# Patient Record
Sex: FEMALE | Race: WHITE | HISPANIC OR LATINO
[De-identification: names, ages, dates, MRNs, and addresses within clinical notes are randomized per-mention and may not be internally consistent; named-entity substitution may affect disease eponyms.]

---

## 2018-05-15 ENCOUNTER — APPOINTMENT (OUTPATIENT)
Dept: HEPATOLOGY | Facility: CLINIC | Age: 33
End: 2018-05-15
Payer: COMMERCIAL

## 2018-05-15 VITALS
DIASTOLIC BLOOD PRESSURE: 81 MMHG | BODY MASS INDEX: 25.4 KG/M2 | SYSTOLIC BLOOD PRESSURE: 119 MMHG | TEMPERATURE: 98.6 F | HEIGHT: 59 IN | HEART RATE: 96 BPM | RESPIRATION RATE: 14 BRPM | WEIGHT: 126 LBS

## 2018-05-15 DIAGNOSIS — Z78.9 OTHER SPECIFIED HEALTH STATUS: ICD-10-CM

## 2018-05-15 PROCEDURE — 99204 OFFICE O/P NEW MOD 45 MIN: CPT

## 2018-05-16 LAB
AFP-TM SERPL-MCNC: 1.6 NG/ML
ALBUMIN SERPL ELPH-MCNC: 4.5 G/DL
ALP BLD-CCNC: 51 U/L
ALT SERPL-CCNC: 12 U/L
ANION GAP SERPL CALC-SCNC: 15 MMOL/L
AST SERPL-CCNC: 18 U/L
BASOPHILS # BLD AUTO: 0.01 K/UL
BASOPHILS NFR BLD AUTO: 0.2 %
BILIRUB SERPL-MCNC: <0.2 MG/DL
BUN SERPL-MCNC: 8 MG/DL
CALCIUM SERPL-MCNC: 9.8 MG/DL
CHLORIDE SERPL-SCNC: 105 MMOL/L
CO2 SERPL-SCNC: 22 MMOL/L
CREAT SERPL-MCNC: 0.56 MG/DL
EOSINOPHIL # BLD AUTO: 0.13 K/UL
EOSINOPHIL NFR BLD AUTO: 2.1 %
GLUCOSE SERPL-MCNC: 86 MG/DL
HBV CORE IGG+IGM SER QL: REACTIVE
HCT VFR BLD CALC: 36.2 %
HGB BLD-MCNC: 11.6 G/DL
IMM GRANULOCYTES NFR BLD AUTO: 0.3 %
INR PPP: 0.93 RATIO
LYMPHOCYTES # BLD AUTO: 3.02 K/UL
LYMPHOCYTES NFR BLD AUTO: 47.9 %
MAN DIFF?: NORMAL
MCHC RBC-ENTMCNC: 28.4 PG
MCHC RBC-ENTMCNC: 32 GM/DL
MCV RBC AUTO: 88.5 FL
MONOCYTES # BLD AUTO: 0.6 K/UL
MONOCYTES NFR BLD AUTO: 9.5 %
NEUTROPHILS # BLD AUTO: 2.53 K/UL
NEUTROPHILS NFR BLD AUTO: 40 %
PLATELET # BLD AUTO: 460 K/UL
POTASSIUM SERPL-SCNC: 4 MMOL/L
PROT SERPL-MCNC: 8 G/DL
PT BLD: 10.5 SEC
RBC # BLD: 4.09 M/UL
RBC # FLD: 13.4 %
SODIUM SERPL-SCNC: 142 MMOL/L
WBC # FLD AUTO: 6.31 K/UL

## 2018-05-17 LAB
HBV DNA # SERPL NAA+PROBE: 2214 IU/ML
HBV E AB SER QL: POSITIVE
HBV E AG SER QL: NEGATIVE
HEPB DNA PCR LOG: 3.35 LOGIU/ML

## 2018-05-31 ENCOUNTER — APPOINTMENT (OUTPATIENT)
Dept: HEPATOLOGY | Facility: CLINIC | Age: 33
End: 2018-05-31

## 2018-06-04 ENCOUNTER — APPOINTMENT (OUTPATIENT)
Dept: HEPATOLOGY | Facility: CLINIC | Age: 33
End: 2018-06-04
Payer: COMMERCIAL

## 2018-06-04 VITALS
BODY MASS INDEX: 25.4 KG/M2 | TEMPERATURE: 98.2 F | WEIGHT: 126 LBS | RESPIRATION RATE: 16 BRPM | DIASTOLIC BLOOD PRESSURE: 67 MMHG | HEIGHT: 59 IN | SYSTOLIC BLOOD PRESSURE: 111 MMHG | HEART RATE: 72 BPM

## 2018-06-04 DIAGNOSIS — B18.1 CHRONIC VIRAL HEPATITIS B W/OUT DELTA-AGENT: ICD-10-CM

## 2018-06-04 PROCEDURE — 91200 LIVER ELASTOGRAPHY: CPT

## 2018-06-04 PROCEDURE — ZZZZZ: CPT

## 2018-06-04 PROCEDURE — 99214 OFFICE O/P EST MOD 30 MIN: CPT | Mod: 25

## 2018-06-07 ENCOUNTER — FORM ENCOUNTER (OUTPATIENT)
Age: 33
End: 2018-06-07

## 2018-06-08 ENCOUNTER — OUTPATIENT (OUTPATIENT)
Dept: OUTPATIENT SERVICES | Facility: HOSPITAL | Age: 33
LOS: 1 days | End: 2018-06-08
Payer: COMMERCIAL

## 2018-06-08 ENCOUNTER — APPOINTMENT (OUTPATIENT)
Dept: ULTRASOUND IMAGING | Facility: IMAGING CENTER | Age: 33
End: 2018-06-08
Payer: COMMERCIAL

## 2018-06-08 DIAGNOSIS — B18.1 CHRONIC VIRAL HEPATITIS B WITHOUT DELTA-AGENT: ICD-10-CM

## 2018-06-08 PROCEDURE — 76700 US EXAM ABDOM COMPLETE: CPT

## 2018-06-08 PROCEDURE — 76700 US EXAM ABDOM COMPLETE: CPT | Mod: 26

## 2018-09-04 ENCOUNTER — APPOINTMENT (OUTPATIENT)
Dept: HEPATOLOGY | Facility: CLINIC | Age: 33
End: 2018-09-04

## 2019-08-19 ENCOUNTER — OUTPATIENT (OUTPATIENT)
Dept: OUTPATIENT SERVICES | Facility: HOSPITAL | Age: 34
LOS: 1 days | End: 2019-08-19

## 2019-08-19 ENCOUNTER — INPATIENT (INPATIENT)
Facility: HOSPITAL | Age: 34
LOS: 2 days | Discharge: HOME | End: 2019-08-22
Attending: OBSTETRICS & GYNECOLOGY | Admitting: OBSTETRICS & GYNECOLOGY

## 2019-08-19 VITALS
DIASTOLIC BLOOD PRESSURE: 74 MMHG | TEMPERATURE: 99 F | OXYGEN SATURATION: 98 % | HEIGHT: 59 IN | WEIGHT: 147.05 LBS | SYSTOLIC BLOOD PRESSURE: 114 MMHG | RESPIRATION RATE: 16 BRPM | HEART RATE: 77 BPM

## 2019-08-19 VITALS
TEMPERATURE: 98 F | HEART RATE: 75 BPM | SYSTOLIC BLOOD PRESSURE: 112 MMHG | RESPIRATION RATE: 18 BRPM | DIASTOLIC BLOOD PRESSURE: 63 MMHG

## 2019-08-19 DIAGNOSIS — O24.414 GESTATIONAL DIABETES MELLITUS IN PREGNANCY, INSULIN CONTROLLED: ICD-10-CM

## 2019-08-19 DIAGNOSIS — N97.9 FEMALE INFERTILITY, UNSPECIFIED: Chronic | ICD-10-CM

## 2019-08-19 DIAGNOSIS — O24.410 GESTATIONAL DIABETES MELLITUS IN PREGNANCY, DIET CONTROLLED: ICD-10-CM

## 2019-08-19 DIAGNOSIS — Z3A.00 WEEKS OF GESTATION OF PREGNANCY NOT SPECIFIED: ICD-10-CM

## 2019-08-19 DIAGNOSIS — Z90.79 ACQUIRED ABSENCE OF OTHER GENITAL ORGAN(S): Chronic | ICD-10-CM

## 2019-08-19 DIAGNOSIS — Z98.890 OTHER SPECIFIED POSTPROCEDURAL STATES: Chronic | ICD-10-CM

## 2019-08-19 DIAGNOSIS — O26.899 OTHER SPECIFIED PREGNANCY RELATED CONDITIONS, UNSPECIFIED TRIMESTER: ICD-10-CM

## 2019-08-19 LAB
ALBUMIN SERPL ELPH-MCNC: 3.8 G/DL — SIGNIFICANT CHANGE UP (ref 3.3–5)
ALP SERPL-CCNC: 159 U/L — HIGH (ref 40–120)
ALT FLD-CCNC: 16 U/L — SIGNIFICANT CHANGE UP (ref 4–33)
ANION GAP SERPL CALC-SCNC: 14 MMO/L — SIGNIFICANT CHANGE UP (ref 7–14)
APPEARANCE UR: CLEAR — SIGNIFICANT CHANGE UP
APTT BLD: 26.7 SEC — LOW (ref 27.5–36.3)
AST SERPL-CCNC: 17 U/L — SIGNIFICANT CHANGE UP (ref 4–32)
BASOPHILS # BLD AUTO: 0.02 K/UL — SIGNIFICANT CHANGE UP (ref 0–0.2)
BASOPHILS NFR BLD AUTO: 0.2 % — SIGNIFICANT CHANGE UP (ref 0–2)
BILIRUB SERPL-MCNC: 0.2 MG/DL — SIGNIFICANT CHANGE UP (ref 0.2–1.2)
BILIRUB UR-MCNC: NEGATIVE — SIGNIFICANT CHANGE UP
BLD GP AB SCN SERPL QL: NEGATIVE — SIGNIFICANT CHANGE UP
BLOOD UR QL VISUAL: NEGATIVE — SIGNIFICANT CHANGE UP
BUN SERPL-MCNC: 5 MG/DL — LOW (ref 7–23)
CALCIUM SERPL-MCNC: 9.8 MG/DL — SIGNIFICANT CHANGE UP (ref 8.4–10.5)
CHLORIDE SERPL-SCNC: 102 MMOL/L — SIGNIFICANT CHANGE UP (ref 98–107)
CO2 SERPL-SCNC: 20 MMOL/L — LOW (ref 22–31)
COLOR SPEC: SIGNIFICANT CHANGE UP
CREAT SERPL-MCNC: 0.45 MG/DL — LOW (ref 0.5–1.3)
EOSINOPHIL # BLD AUTO: 0.14 K/UL — SIGNIFICANT CHANGE UP (ref 0–0.5)
EOSINOPHIL NFR BLD AUTO: 1.6 % — SIGNIFICANT CHANGE UP (ref 0–6)
GLUCOSE SERPL-MCNC: 77 MG/DL — SIGNIFICANT CHANGE UP (ref 70–99)
GLUCOSE UR-MCNC: NEGATIVE — SIGNIFICANT CHANGE UP
HCT VFR BLD CALC: 35.9 % — SIGNIFICANT CHANGE UP (ref 34.5–45)
HCT VFR BLD CALC: 36.8 % — SIGNIFICANT CHANGE UP (ref 34.5–45)
HGB BLD-MCNC: 12 G/DL — SIGNIFICANT CHANGE UP (ref 11.5–15.5)
HGB BLD-MCNC: 12.4 G/DL — SIGNIFICANT CHANGE UP (ref 11.5–15.5)
IMM GRANULOCYTES NFR BLD AUTO: 0.6 % — SIGNIFICANT CHANGE UP (ref 0–1.5)
INR BLD: 0.91 — SIGNIFICANT CHANGE UP (ref 0.88–1.17)
KETONES UR-MCNC: NEGATIVE — SIGNIFICANT CHANGE UP
LEUKOCYTE ESTERASE UR-ACNC: NEGATIVE — SIGNIFICANT CHANGE UP
LYMPHOCYTES # BLD AUTO: 2.13 K/UL — SIGNIFICANT CHANGE UP (ref 1–3.3)
LYMPHOCYTES # BLD AUTO: 24.7 % — SIGNIFICANT CHANGE UP (ref 13–44)
MCHC RBC-ENTMCNC: 32 PG — SIGNIFICANT CHANGE UP (ref 27–34)
MCHC RBC-ENTMCNC: 32.2 PG — SIGNIFICANT CHANGE UP (ref 27–34)
MCHC RBC-ENTMCNC: 33.4 % — SIGNIFICANT CHANGE UP (ref 32–36)
MCHC RBC-ENTMCNC: 33.7 % — SIGNIFICANT CHANGE UP (ref 32–36)
MCV RBC AUTO: 95.1 FL — SIGNIFICANT CHANGE UP (ref 80–100)
MCV RBC AUTO: 96.2 FL — SIGNIFICANT CHANGE UP (ref 80–100)
MONOCYTES # BLD AUTO: 0.72 K/UL — SIGNIFICANT CHANGE UP (ref 0–0.9)
MONOCYTES NFR BLD AUTO: 8.4 % — SIGNIFICANT CHANGE UP (ref 2–14)
NEUTROPHILS # BLD AUTO: 5.55 K/UL — SIGNIFICANT CHANGE UP (ref 1.8–7.4)
NEUTROPHILS NFR BLD AUTO: 64.5 % — SIGNIFICANT CHANGE UP (ref 43–77)
NITRITE UR-MCNC: NEGATIVE — SIGNIFICANT CHANGE UP
NRBC # FLD: 0 K/UL — SIGNIFICANT CHANGE UP (ref 0–0)
NRBC # FLD: 0.02 K/UL — SIGNIFICANT CHANGE UP (ref 0–0)
PH UR: 6.5 — SIGNIFICANT CHANGE UP (ref 5–8)
PLATELET # BLD AUTO: 293 K/UL — SIGNIFICANT CHANGE UP (ref 150–400)
PLATELET # BLD AUTO: 315 K/UL — SIGNIFICANT CHANGE UP (ref 150–400)
PMV BLD: 10.1 FL — SIGNIFICANT CHANGE UP (ref 7–13)
PMV BLD: 10.3 FL — SIGNIFICANT CHANGE UP (ref 7–13)
POTASSIUM SERPL-MCNC: 3.7 MMOL/L — SIGNIFICANT CHANGE UP (ref 3.5–5.3)
POTASSIUM SERPL-SCNC: 3.7 MMOL/L — SIGNIFICANT CHANGE UP (ref 3.5–5.3)
PROT SERPL-MCNC: 6.9 G/DL — SIGNIFICANT CHANGE UP (ref 6–8.3)
PROT UR-MCNC: NEGATIVE — SIGNIFICANT CHANGE UP
PROTHROM AB SERPL-ACNC: 10.1 SEC — SIGNIFICANT CHANGE UP (ref 9.8–13.1)
RBC # BLD: 3.73 M/UL — LOW (ref 3.8–5.2)
RBC # BLD: 3.87 M/UL — SIGNIFICANT CHANGE UP (ref 3.8–5.2)
RBC # FLD: 12.9 % — SIGNIFICANT CHANGE UP (ref 10.3–14.5)
RBC # FLD: 12.9 % — SIGNIFICANT CHANGE UP (ref 10.3–14.5)
RH IG SCN BLD-IMP: POSITIVE — SIGNIFICANT CHANGE UP
SODIUM SERPL-SCNC: 136 MMOL/L — SIGNIFICANT CHANGE UP (ref 135–145)
SP GR SPEC: 1.01 — SIGNIFICANT CHANGE UP (ref 1–1.04)
UROBILINOGEN FLD QL: NORMAL — SIGNIFICANT CHANGE UP
WBC # BLD: 7.64 K/UL — SIGNIFICANT CHANGE UP (ref 3.8–10.5)
WBC # BLD: 8.61 K/UL — SIGNIFICANT CHANGE UP (ref 3.8–10.5)
WBC # FLD AUTO: 7.64 K/UL — SIGNIFICANT CHANGE UP (ref 3.8–10.5)
WBC # FLD AUTO: 8.61 K/UL — SIGNIFICANT CHANGE UP (ref 3.8–10.5)

## 2019-08-19 RX ORDER — SODIUM CHLORIDE 9 MG/ML
1000 INJECTION, SOLUTION INTRAVENOUS
Refills: 0 | Status: DISCONTINUED | OUTPATIENT
Start: 2019-08-19 | End: 2019-08-20

## 2019-08-19 RX ORDER — OXYTOCIN 10 UNIT/ML
VIAL (ML) INJECTION
Qty: 20 | Refills: 0 | Status: DISCONTINUED | OUTPATIENT
Start: 2019-08-19 | End: 2019-08-20

## 2019-08-19 RX ORDER — SODIUM CHLORIDE 9 MG/ML
1000 INJECTION, SOLUTION INTRAVENOUS ONCE
Refills: 0 | Status: COMPLETED | OUTPATIENT
Start: 2019-08-19 | End: 2019-08-20

## 2019-08-19 NOTE — OB PST NOTE - HISTORY OF PRESENT ILLNESS
34 y/o female  presents to PST for preoperative evaluation at 39 weeks presents with a SHYANNE 2019. Scheduled for Induction of Labor for GDM on . Pt currently reports cramping every 10 minutes and pelvic pressure since last night.

## 2019-08-19 NOTE — OB PROVIDER H&P - ASSESSMENT
Dr. Mahajan's pt. is a 34y/o EGA 39wks . Pt. was sent in from presurgical testing for contractions. Pt. denies LOF and VB. Pt. report FM.     AP: IVF pregnancy, GDMA1  Medical Hx: Hepatitis B and Hepatitis C (Dx during prenatal testing)   Surgical Hx: Left salpingectomy   OBGYN Hx:  2007 7-0  SABx4  Meds:PNV  NKDA    Assessment/Plan:  SVE:2.5/60/-2  TAS: Cephalic presentation, posterior placenta, DA:17, BPP:  FHR:150bpm, moderate variability, accels noted, mild variables noted  Brianda every 5mins    Dr. Young reviewed tracing  Discussed findings with Dr. Isabel.  -Admit to L&D for recurrent variables  -Induce with PO cytotec

## 2019-08-19 NOTE — OB PROVIDER H&P - HISTORY OF PRESENT ILLNESS
Dr. Mahajan's pt. is a 34y/o EGA 39wks . Pt. was sent in from presurgical testing for contractions. Pt. denies LOF and VB. Pt. report FM.

## 2019-08-19 NOTE — OB PST NOTE - PMH
Gestational diabetes  performs accu checks QID  Hepatitis B  chronic Hep B  Hepatitis C  never treated  Infertility, female    Missed

## 2019-08-19 NOTE — OB RN PATIENT PROFILE - NSPRENATALGBS_OBGYN_ALL_OB_START_DATE
=================================================================



=================================================================

Datetime Report Generated by CPN: 2018 14:34

   

   

=================================================================

CURRENT ADMISSION

=================================================================

   

Chief Complaint:  Uterine Contractions; Suspected Ruptured Membranes

Indication for Induction:  Not Applicable

Admit Impression :  Term, Intrauterine Pregnancy; Ruptured Membranes

Admit Impression- Other:  SROM at 1400 today-clear

Admit Plan:  Admit to Unit; Initiate Labor Protocol

   

=================================================================

ALLERGIES

=================================================================

   

Medication Allergies:  No

Medication Allergies:  No Known Allergies (2018)

Latex:  No Latex Allergies

   

=================================================================

OBSTETRICAL HISTORY

=================================================================

   

EDC:  2018 00:00

:  1

Para:  0

Term:  0

:  0

SAB:  0

IAB:  0

Ectopic:  0

Livin

Cesareans:  0

VBACs:  0

Multiple Births:  0

Gestational Diabetes:  No

Rh Sensitization:  No

Incompetent Cervix:  No

LUIS M:  No

Infertility:  No

ART Treatment:  No

Uterine Anomaly:  No

IUGR:  No

Hx Previous C/S:  No

Macrosomia:  No

Hx Loss/Stillborn:  No

PIH:  No

Hx  Death:  No

Placenta Previa/Abruption:  No

Depression/PP Depression:  Yes

PTL/PROM:  No

Post Partum Hemorrhage:  No

Current Pregnancy Procedures:  Ultrasound; NST

Obstetrical History Comments:  G1: Current 

   

=================================================================

***SEE PRENATAL RECORDS***

=================================================================

   

Alcohol:  No

Marijuana :  Yes

Marijuana Frequency:  Occasional

Years Used:  1 year

Last Used:  2017 00:00

Previous Treatment:  None

Cocaine:  No

Other Illicit Drugs:  No

Cigarettes:  Never Smoker. [id number]

   

=================================================================

MEDICAL HISTORY

=================================================================

   

Diabetes:  No

Blood Transfusion:  No

Pulmonary Disease (Asthma, TB):  Yes

Breast Disease:  No

Hypertension:  No

Gyn Surgery:  No

Heart Disease:  No

Hosp/Surgery:  No

Autoimmune Disorder:  No

Anesthetic Complications:  No

Kidney Disease:  No

Abnormal Pap Smear:  No

Neuro/Epilepsy:  No

Psychiatric Disorders:  Yes

Other Medical Diseases:  No

Hepatitis/Liver Disease:  No

Significant Family History:  No

Varicosities/Phlebitis:  No

Trauma/Violence :  No

Thyroid Dysfunction:  No

Medical History Comments:  asthma, depression 

   

=================================================================

INFECTIOUS HISTORY

=================================================================

   

Gonorrhea:  No

Genital Herpes:  No

Chlamydia:  Yes

Tuberculosis:  No

Syphilis:  No

Hepatitis:  No

HIV/AIDS Exposure:  No

Rash or Viral Illness:  No

HPV:  No

Infectious History Comments:  chlamydia 2016 

   

=================================================================

PHYSICAL EXAM

=================================================================

   

General:  Normal

HEENT:  Normal

Neurologic:  Normal

Thyroid:  Deferred

Heart:  Normal

Lungs:  Normal

Breast:  Deferred

Back:  Normal

Abdomen:  Normal

Genitourinary Exam:  Deferred

Extremities:  Normal

DTRs:  Normal

Pelvic Type:  Not Done

Physical Exam Comments:  Gravid uterus

Vital Signs:  Reviewed; Within Normal Limits

   

=================================================================

MEMBRANES

=================================================================

   

Membranes:  Ruptured

Amniotic Fluid Color:  Clear

   

=================================================================

FETUS A

=================================================================

   

EGA:  38.5

Monitoring:  External US

FHR- Baseline:  150

Variability:  Moderate 6-25bpm

FHR Category:  Category I

Fetal Presentation:  Vertex

Admit Comment:  G1 

   Young teen-19 yo

   SROM at 1400-clear fluid

   Rh negative

   Insufficient antepartum care-lapse in care from 28-33 wks 

   Failed 1hrgtt and passed 3hrgtt

   Records available-reviewed

      

   

=================================================================

PLANS FOR LABOR AND DELIVERY

=================================================================

   

Labor and Delivery:  None

Pain Management:  Epidural

Feeding Preference:  Formula

Benefit of Breast Feed Discussed:  Yes

Circumcision:  N/A

   

=================================================================

INFORMED CONSENT

=================================================================

   

Assignment:  Augustina Larose MD

Signature:  Electronically signed by Liz Gracia CNM on 2018 at

   14:33  with User ID: Jean Paul

:  Electronically signed by Lzi Gracia CNM on 2018 at 14:33 

   with User ID: Jean Paul

:  I personally evaluated and examined the patient in conjunction with

   the MLP and agree with the assessment, treatment plan and

   disposition.
30-Jul-2019

## 2019-08-19 NOTE — OB PST NOTE - NSHPREVIEWOFSYSTEMS_GEN_ALL_CORE
General: weight gain 2/2 to pregnancy. No fever, chills, sweating, anorexia, weight loss. No polyphagia, polyuria, polydipsia, malaise, or fatigue    Skin: No rashes, itching, or dryness. No change in size/color of moles. No tumors, brittle nails, pitted nails, or hair loss    Breast: No tenderness, lumps, or nipple discharge      Ophthalmologic: No diplopia, photophobia, lacrimation, blurred Vision , or eye discharge    ENMT Symptoms: No hearing difficulty, ear pain, tinnitus, or vertigo. No sinus symptoms, nasal congestion, nasal   discharge, or nasal obstruction    Respiratory and Thorax: No wheezing, dyspnea, cough, hemoptysis, or pleuritic chest pain     Cardiovascular: No chest pain, palpitations, dyspnea on exertion, orthopnea, paroxysmal nocturnal dyspnea,   peripheral edema, or claudication    Gastrointestinal:  nausea and vomiting at 36 weeks, No diarrhea, constipation, change in bowel habits, flatulence, abdominal pain, or melena    Genitourinary/ Pelvis: Cramping and pelvic pressure starting 24 hours ago. No hematuria, renal colic, or flank pain.  No urine discoloration, incontinence, dysuria, or urinary hesitancy. Normal urinary frequency. No nocturia, abnormal vaginal bleeding, vaginal discharge, spotting, or vaginal leakage    Musculoskeletal: Lower back pain. No arthralgia, arthritis, joint swelling, muscle cramping, muscle weakness, neck pain, arm pain, or leg pain    Neurological: No transient paralysis, weakness, paresthesias, or seizures. No syncope, tremors, vertigo, loss of sensation, difficulty walking, loss of consciousness, hemiparesis, confusion, or facial palsy    Psychiatric: No suicidal ideation, depression, anxiety, insomnia, memory loss, paranoia, mood swings, agitation, hallucinations, or hyperactivity    Hematology: No gum bleeding, nose bleeding, or skin lumps    Lymphatic: No enlarged or tender lymph nodes. No extremity swelling    Endocrine: Gestational DM. fasting BS 79-80mg/dl. No heat or cold intolerance    Immunologic: No recurrent or persistent infections

## 2019-08-19 NOTE — OB PST NOTE - PROBLEM SELECTOR PLAN 1
Scheduled for Induction of Labor for GDM on 8/23/2019  Preinduction instructions given, pt verbalized understanding   Pt reports cramping every 10 minutes, back pain and pelvic pressure for 24 hours- transferred to [facility] for further evaluation. Report given to nurse Cardona in Triage  Message left with Dr. Mahajan's answering service regarding transfer    Pt advised to hold prenatal MVI starting today   CBC, CMP, UA, RPR, Coags and Type pending

## 2019-08-19 NOTE — OB RN TRIAGE NOTE - NSSUBSTANCEUSE_GEN_ALL_CORE_SD
Obstetrics Triage Note    SUBJECTIVE     Debra Mc is a 27 year old Black/  , 34w0d, estimated date of delivery 2019, Date entered prior to episode creation, who presents to obstetrics triage with complaint of nausea and vomiting since yesterday. She is continuing to have a lot of problems with this, and has not been using any of her meds as she feels they don't help anyway. She is tearful and also states her main employer told her she doesn't have to honor any of the work excuses Selene gave her. (Per the ) The employer continues to schedule shifts when she is supposed to go to therapy sessions. Is considering maternity leave. Partner is kind of supportive.    Primary OB provider: Mari Obrien, mostly Selene.    REVIEW OF SYSTEMS:    Obstetrics: Denies leakage of fluid, bleeding, vision changes, RUQ/epigastric pain, abnormal vaginal discharge, decreased fetal movement and abdominal trauma .  No fevers overnight  No chest pain or shortness of breath  Constitutional:  positive for fatigue and weight loss  Cardiovascular:  negative  Gastrointestinal:  positive for nausea, vomiting and food intolerance  Genitourinary:  negative  Musculoskeletal:  positive for back pain, stiff joints and pelvic pain  Behavioral/Psychiatric:  positive for depression and very sad and tearful..    Fetal movement present.  I have reviewed the patient's medications and allergies, past medical, surgical, social and family history, updating these as appropriate.  See Histories section of the EMR for a display of this information.   Past Medical History:   Diagnosis Date   • Bacterial vaginosis 2019   • Depressive disorder    • Gestational hypertension    • LSIL (low grade squamous intraepithelial lesion) on Pap smear 10/2014   • STD (sexually transmitted disease)     trichomonas, chlamydia   • Traumatic injury during pregnancy 2019   • UTI (urinary tract infection)      Past Surgical  History:   Procedure Laterality Date   • Cholecystectomy     • Tonsillectomy       Obstetric History       T2      L2     SAB1   TAB0   Ectopic0   Molar0   Multiple0   Live Births2       # Outcome Date GA Lbr Eduardo/2nd Weight Sex Delivery Anes PTL Lv   4 Current            3 Term 17 39w5d  3629 g M Vag-Spont EPI N ENEDELIA   2 Term 14 40w0d 13:17 / 00:44 3184 g M Vag-Spont IV ANALGESIC, EPI N ENEDELIA      Complications: Chorioamnionitis,Gestational hypertension      Apgar1:  9                Apgar5: 9   1 SAB      SPONTANEOUS         Obstetric Comments   12 x 28 x 3-5 x med ,    Hx trichomonas, chlamydia   Hx of abnormal pap.    Last Pap: 2018- ASCUS, negative HPV.  Louise done during preg.     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    [place]                 Comment: full time      OBJECTIVE     PHYSICAL EXAM  Visit Vitals  /67 (BP Location: [facility] [place], Patient Position: Semi-Narayan's)   Pulse 90   Temp 98.3 °F (36.8 °C) (Oral)   Resp 16   Ht 5' 4\" (1.626 m)   Wt 94.3 kg   LMP 2018   BMI 35.70 kg/m²       General:   Well developed, well nourished, , female, in no acute distress   Psychiatric:  Alert and oriented x3, appropriate mood and affect   Skin:   Warm and dry, intact, no rashes or lesions   Lungs:   Respirations unlabored   Heart:   Regular rate and rhythm   Abdomen:  Gravid, soft, nontender, no masses   Extremities No edema   Fundal Height:     Sterile Speculum Exam: Genitourinary:  Vulva- External genitalia reveals normal mons pubis, labia minora and labia majora. Normal-appearing clitoris, urethral meatus and Gotha's  glands.    Bladder- No evidence of urethral or bladder tenderness.    Vagina- Vaginal mucosa pink and moist. Bartholin, urethral, and [place] glands without mass or exudate.   Cervix- Cervix pink, smooth, no lesions. Perineum- No lesions.   Sterile Vaginal Exam:    Dilation     Effacement     Station     Consistency     Position  Presentation             Fetal Surveillance:    Fetal Heart Rate  Mode: External US  Baseline: 135 bpm  Classification: Normal  Variability: Moderate  Pattern: Accelerations    Uterine Activity  Mode: [place]  Frequency: irreg  Duration: 40-70  Quality: Mild  Resting Tone: Soft    Membrane Status: Intact    LAB:  No results found for this visit on 03/15/19.    GBS:   Results for orders placed or performed in visit on 07/17/14   STREP GROUP B CULTURE   Result Value Ref Range    CULTURE       NEGATIVE FOR STREPTOCOCCUS AGALACTIAE (STREP GROUP B)       ASSESSMENT     34w0d IUP  There are no active hospital problems to display for this patient.     Not in labor. and chronic nausea/vomiting of pregnancy, cyclical vomiting.  Fetal Heart Rate Interpretation: Category I (03/15/19 3765)    PLAN   Home medications reviewed and Feeisha instructed to Continue as currently prescribed specifically to try to use benadryl OR diclegis at bedtime.  DISPOSITION:  Danger signs/symptoms, comfort measures and fetal movement counting reviewed prior to discharge home. Patient informed to keep appointment with provider as scheduled.  Home ambulatory.      Isela Jessica CNM   never used

## 2019-08-19 NOTE — OB PST NOTE - NSHPPHYSICALEXAM_GEN_ALL_CORE
Constitutional: Uncomfortable- pelvic pressure and cramping. Well Developed, Well Groomed, Well Nourished,    Eyes: PERRL, EOMI, conjunctiva clear    Ears: Normal    Mouth & Gums: Normal, moist    Pharynx: No tenderness, discharge, or peritonsillar abscess    Tonsils: No Redness, discharge, tenderness, or swelling    Neck: Supple, no JVD, normal thyroid glands, no carotid bruits, no cervical vertebral or paraspinal tenderness    Breast: Normal shape    Back: Normal shape, ROM intact, strength intact, no vertebral tenderness    Respiratory: Airway patent, breath sounds equal, good air movement, respiration non-labored, clear to auscultation bilateral, no chest wall tenderness, no intercostal retractions, no rales, no wheezes, no rhonchi, no subcutaneous emphysema    Cardiovascular:  Regular rate and rhythm, no rubs or murmur, normal PMI    Gastrointestinal: Pregnant abdomen. Pt reports fetal movement during PST interview    Extremities: No clubbing, cyanosis, or pedal edema    Vascular:  Carotid Pulse normal , Radial Pulse normal    Neurological: alert & oriented x 3, sensation intact, deep reflexes intact, cranial nerve intact, normal strength    Skin: warm and dry, normal color    Lymph Nodes: normal posterior cervical lymph node, normal anterior cervical lymph node, normal supraclavicular lymph node    Musculoskeletal: ROM intact, no joint swelling, warmth, or calf tenderness. Normal strength    Psychiatric: normal affect, normal behavior

## 2019-08-19 NOTE — OB PST NOTE - PSH
Female infertility  s/p 1 IVF Dec 3rd 2019  H/O unilateral salpingectomy    NVD (normal vaginal delivery)    S/P D&C (status post dilation and curettage)

## 2019-08-19 NOTE — OB PROVIDER H&P - NSHPPHYSICALEXAM_GEN_ALL_CORE
SVE:2.5/60/-2  TAS: Cephalic presentation, posterior placenta, DA:17, BPP:8/8  FHR:150bpm, moderate variability, accels noted, mild variables  Brianda every 5mins

## 2019-08-19 NOTE — OB PROVIDER TRIAGE NOTE - NSHPPHYSICALEXAM_GEN_ALL_CORE
SVE:2.5/60/-2  TAS: Cephalic presentation, posterior placenta, DA:17, BPP:8/8  NST in progress SVE:2.5/60/-2  TAS: Cephalic presentation, posterior placenta, DA:17, BPP:8/8  FHR:150bpm, moderate variability, accels noted, mild variables  Brianda every 5mins

## 2019-08-19 NOTE — OB PROVIDER TRIAGE NOTE - NSOBPROVIDERNOTE_OBGYN_ALL_OB_FT
Dr. Mahajan's pt. is a 32y/o EGA 39wks . Pt. was sent in from presurgical testing for contractions. Pt. denies LOF and VB. Pt. report FM.     AP: IVF pregnancy, GDMA1  Medical Hx:   Surgical Hx: Left salpingectomy   OBGYN Hx:  2007 7-0  SABx4  Meds:PNV  NKDA    Assessment/Plan:  SVE:2.5/60/-2  TAS:  NST in progress Dr. Mahajan's pt. is a 32y/o EGA 39wks . Pt. was sent in from presurgical testing for contractions. Pt. denies LOF and VB. Pt. report FM.     AP: IVF pregnancy, GDMA1  Medical Hx:   Surgical Hx: Left salpingectomy   OBGYN Hx:  2007 7-0  SABx4  Meds:PNV  NKDA    Assessment/Plan:  SVE:2.5/60/-2  TAS: Cephalic presentation, posterior placenta, DA:17, BPP:  NST in progress Dr. Mahajan's pt. is a 34y/o EGA 39wks . Pt. was sent in from presurgical testing for contractions. Pt. denies LOF and VB. Pt. report FM.     AP: IVF pregnancy, GDMA1  Medical Hx:   Surgical Hx: Left salpingectomy   OBGYN Hx:  2007 7-0  SABx4  Meds:PNV  NKDA    Assessment/Plan:  SVE:2.5/60/-2  TAS: Cephalic presentation, posterior placenta, DA:17, BPP:  FHR:150bpm, moderate variability, accels noted, mild variables noted  Brianda every 5mins    Dr. Young reviewed tracing  Discussed findings with Dr. Isabel.  -Admit to L&D for recurrent variables  -Induce with PO cytotec

## 2019-08-19 NOTE — OB PST NOTE - NSANTHOSAYNRD_GEN_A_CORE
Rm 13 Chief Complaint Patient presents with [facility] Annual Wellness Visit  Blood Pressure Check  Cholesterol Problem 1. Have you been to the ER, urgent care clinic since your last visit? Hospitalized since your last visit? No 
 
2. Have you seen or consulted any other health care providers outside of the [address] [place] [address] since your last visit? Include any pap smears or colon screening. No 
 
Health Maintenance Due Topic Date Due  Shingrix Vaccine Age 50> (1 of 2) 11/22/2000  Influenza Age 5 to Adult  08/01/2018  MEDICARE YEARLY EXAM  09/08/2018 Fall Risk Assessment, last 12 mths 10/9/2018 Able to walk? Yes Fall in past 12 months? No  
 
Abuse Screening Questionnaire 10/9/2018 Do you ever feel afraid of your partner? Henretta Expose Are you in a relationship with someone who physically or mentally threatens you? Henretta Expose Is it safe for you to go home? Y  
 
ADL Assessment 10/9/2018 Feeding yourself No Help Needed Getting from bed to chair No Help Needed Getting dressed No Help Needed Bathing or showering No Help Needed Walk across the room (includes cane/walker) No Help Needed Using the telphone No Help Needed Taking your medications No Help Needed Preparing meals No Help Needed Managing money (expenses/bills) No Help Needed Moderately strenuous housework (laundry) No Help Needed Shopping for personal items (toiletries/medicines) No Help Needed Shopping for groceries No Help Needed Driving No Help Needed Climbing a flight of stairs No Help Needed Getting to places beyond walking distances No Help Needed PHQ over the last two weeks 10/9/2018 Little interest or pleasure in doing things Several days Feeling down, depressed, irritable, or hopeless Several days Total Score PHQ 2 2 Learning Assessment 1/9/2018 PRIMARY LEARNER Patient HIGHEST LEVEL OF EDUCATION - PRIMARY LEARNER  GRADUATED HIGH SCHOOL OR GED  
BARRIERS PRIMARY LEARNER NONE CO-LEARNER CAREGIVER No  
PRIMARY LANGUAGE ENGLISH  
LEARNER PREFERENCE PRIMARY READING  
ANSWERED BY patient RELATIONSHIP SELF  
 
 
 No. GET screening performed.  STOP BANG Legend: 0-2 = LOW Risk; 3-4 = INTERMEDIATE Risk; 5-8 = HIGH Risk

## 2019-08-19 NOTE — OB PST NOTE - NS_OBGYNHISTORY_OBGYN_ALL_OB_FT
h/o left salpingectomy in 2018 for "blocked tube"   Infertility- s/p IVF in Dec 2018 with current pregnancy    Gestational DM  h/o Missed AB in 2006

## 2019-08-19 NOTE — OB PROVIDER TRIAGE NOTE - HISTORY OF PRESENT ILLNESS
Dr. Mahajan's pt. is a 32y/o EGA 39wks . Pt. was sent in from presurgical testing for contractions. Pt. denies LOF and VB. Pt. report FM.

## 2019-08-20 LAB
GLUCOSE BLDC GLUCOMTR-MCNC: 70 MG/DL — SIGNIFICANT CHANGE UP (ref 70–99)
GLUCOSE BLDC GLUCOMTR-MCNC: 85 MG/DL — SIGNIFICANT CHANGE UP (ref 70–99)
HBV SURFACE AB SER-ACNC: NONREACTIVE — SIGNIFICANT CHANGE UP
HBV SURFACE AG SER-ACNC: HIGH
HBV SURFACE AG SER-ACNC: REACTIVE — SIGNIFICANT CHANGE UP
RH IG SCN BLD-IMP: POSITIVE — SIGNIFICANT CHANGE UP
RUBV IGG SER-ACNC: 2 INDEX — SIGNIFICANT CHANGE UP
RUBV IGG SER-IMP: POSITIVE — SIGNIFICANT CHANGE UP
T PALLIDUM AB TITR SER: NEGATIVE — SIGNIFICANT CHANGE UP
T PALLIDUM AB TITR SER: NEGATIVE — SIGNIFICANT CHANGE UP

## 2019-08-20 RX ORDER — HYDROCORTISONE 1 %
1 OINTMENT (GRAM) TOPICAL EVERY 6 HOURS
Refills: 0 | Status: DISCONTINUED | OUTPATIENT
Start: 2019-08-20 | End: 2019-08-22

## 2019-08-20 RX ORDER — GLYCERIN ADULT
1 SUPPOSITORY, RECTAL RECTAL AT BEDTIME
Refills: 0 | Status: DISCONTINUED | OUTPATIENT
Start: 2019-08-20 | End: 2019-08-22

## 2019-08-20 RX ORDER — DOCUSATE SODIUM 100 MG
100 CAPSULE ORAL
Refills: 0 | Status: DISCONTINUED | OUTPATIENT
Start: 2019-08-20 | End: 2019-08-22

## 2019-08-20 RX ORDER — BENZOCAINE 10 %
1 GEL (GRAM) MUCOUS MEMBRANE EVERY 6 HOURS
Refills: 0 | Status: DISCONTINUED | OUTPATIENT
Start: 2019-08-20 | End: 2019-08-22

## 2019-08-20 RX ORDER — OXYTOCIN 10 UNIT/ML
2 VIAL (ML) INJECTION
Qty: 30 | Refills: 0 | Status: DISCONTINUED | OUTPATIENT
Start: 2019-08-20 | End: 2019-08-20

## 2019-08-20 RX ORDER — SIMETHICONE 80 MG/1
80 TABLET, CHEWABLE ORAL EVERY 4 HOURS
Refills: 0 | Status: DISCONTINUED | OUTPATIENT
Start: 2019-08-20 | End: 2019-08-22

## 2019-08-20 RX ORDER — SODIUM CHLORIDE 9 MG/ML
3 INJECTION INTRAMUSCULAR; INTRAVENOUS; SUBCUTANEOUS EVERY 8 HOURS
Refills: 0 | Status: DISCONTINUED | OUTPATIENT
Start: 2019-08-20 | End: 2019-08-22

## 2019-08-20 RX ORDER — OXYTOCIN 10 UNIT/ML
333.33 VIAL (ML) INJECTION
Qty: 20 | Refills: 0 | Status: DISCONTINUED | OUTPATIENT
Start: 2019-08-20 | End: 2019-08-20

## 2019-08-20 RX ORDER — OXYCODONE HYDROCHLORIDE 5 MG/1
5 TABLET ORAL
Refills: 0 | Status: DISCONTINUED | OUTPATIENT
Start: 2019-08-20 | End: 2019-08-22

## 2019-08-20 RX ORDER — OXYCODONE HYDROCHLORIDE 5 MG/1
5 TABLET ORAL ONCE
Refills: 0 | Status: DISCONTINUED | OUTPATIENT
Start: 2019-08-20 | End: 2019-08-22

## 2019-08-20 RX ORDER — TETANUS TOXOID, REDUCED DIPHTHERIA TOXOID AND ACELLULAR PERTUSSIS VACCINE, ADSORBED 5; 2.5; 8; 8; 2.5 [IU]/.5ML; [IU]/.5ML; UG/.5ML; UG/.5ML; UG/.5ML
0.5 SUSPENSION INTRAMUSCULAR ONCE
Refills: 0 | Status: DISCONTINUED | OUTPATIENT
Start: 2019-08-20 | End: 2019-08-22

## 2019-08-20 RX ORDER — PRAMOXINE HYDROCHLORIDE 150 MG/15G
1 AEROSOL, FOAM RECTAL EVERY 4 HOURS
Refills: 0 | Status: DISCONTINUED | OUTPATIENT
Start: 2019-08-20 | End: 2019-08-22

## 2019-08-20 RX ORDER — KETOROLAC TROMETHAMINE 30 MG/ML
30 SYRINGE (ML) INJECTION ONCE
Refills: 0 | Status: DISCONTINUED | OUTPATIENT
Start: 2019-08-20 | End: 2019-08-20

## 2019-08-20 RX ORDER — IBUPROFEN 200 MG
600 TABLET ORAL EVERY 6 HOURS
Refills: 0 | Status: COMPLETED | OUTPATIENT
Start: 2019-08-20 | End: 2020-07-18

## 2019-08-20 RX ORDER — DIBUCAINE 1 %
1 OINTMENT (GRAM) RECTAL EVERY 6 HOURS
Refills: 0 | Status: DISCONTINUED | OUTPATIENT
Start: 2019-08-20 | End: 2019-08-22

## 2019-08-20 RX ORDER — DIPHENHYDRAMINE HCL 50 MG
25 CAPSULE ORAL EVERY 6 HOURS
Refills: 0 | Status: DISCONTINUED | OUTPATIENT
Start: 2019-08-20 | End: 2019-08-22

## 2019-08-20 RX ORDER — ACETAMINOPHEN 500 MG
975 TABLET ORAL
Refills: 0 | Status: DISCONTINUED | OUTPATIENT
Start: 2019-08-20 | End: 2019-08-22

## 2019-08-20 RX ORDER — IBUPROFEN 200 MG
600 TABLET ORAL EVERY 6 HOURS
Refills: 0 | Status: DISCONTINUED | OUTPATIENT
Start: 2019-08-20 | End: 2019-08-22

## 2019-08-20 RX ORDER — LANOLIN
1 OINTMENT (GRAM) TOPICAL EVERY 6 HOURS
Refills: 0 | Status: DISCONTINUED | OUTPATIENT
Start: 2019-08-20 | End: 2019-08-22

## 2019-08-20 RX ORDER — AER TRAVELER 0.5 G/1
1 SOLUTION RECTAL; TOPICAL EVERY 4 HOURS
Refills: 0 | Status: DISCONTINUED | OUTPATIENT
Start: 2019-08-20 | End: 2019-08-22

## 2019-08-20 RX ORDER — MAGNESIUM HYDROXIDE 400 MG/1
30 TABLET, CHEWABLE ORAL
Refills: 0 | Status: DISCONTINUED | OUTPATIENT
Start: 2019-08-20 | End: 2019-08-22

## 2019-08-20 RX ADMIN — SODIUM CHLORIDE 125 MILLILITER(S): 9 INJECTION, SOLUTION INTRAVENOUS at 00:24

## 2019-08-20 RX ADMIN — Medication 30 MILLIGRAM(S): at 14:54

## 2019-08-20 RX ADMIN — Medication 30 MILLIGRAM(S): at 15:18

## 2019-08-20 RX ADMIN — Medication 1000 MILLIUNIT(S)/MIN: at 14:54

## 2019-08-20 RX ADMIN — SODIUM CHLORIDE 2000 MILLILITER(S): 9 INJECTION, SOLUTION INTRAVENOUS at 00:00

## 2019-08-20 NOTE — OB RN DELIVERY SUMMARY - NS_SEPSISRSKCALC_OBGYN_ALL_OB_FT
EOS calculated successfully. EOS Risk Factor: 0.5/1000 live births ([place] national incidence); GA=39w6d; Temp=98.42; ROM=1.9; GBS='Negative'; Antibiotics='No antibiotics or any antibiotics < 2 hrs prior to birth'

## 2019-08-20 NOTE — OB PROVIDER DELIVERY SUMMARY - NSPROVIDERDELIVERYNOTE_OBGYN_ALL_OB_FT
of a live female infant born in good condition apgars 9/9   PLacenta delivered spontaneously.  No lacerations  No increase in risk of bleeding infection or DVT

## 2019-08-20 NOTE — LACTATION INITIAL EVALUATION - LACTATION INTERVENTIONS
assisted with deep latch and positioning  discussed  signs  of  effective  feeding and  swallowing.  discussed  compression at  breast when  nbn  stops  drinking  and  is  still sucking.  instructed  to offer both  breast at a feeding ,feed on cue and safe  skin to skin.   reviewed  importance  of deep  latch and  prevention  of  sore  cracked  nipples/initiate hand expression routine

## 2019-08-20 NOTE — CHART NOTE - NSCHARTNOTEFT_GEN_A_CORE
PA Tracing Note    VS  T(C): 36.8 (08-20-19 @ 06:20)  HR: 71 (08-20-19 @ 06:19)  BP: 102/69 (08-20-19 @ 06:19)  RR: 18 (08-19-19 @ 23:45)  SpO2: 98% (08-19-19 @ 19:33)    135/mod mikhail/+accels/no decels  [place] q 4-5min    cont efm/toco  cont PO cytotec IOL for GDMA1  reassuring fetal status  trudi fountain

## 2019-08-20 NOTE — LACTATION INITIAL EVALUATION - INTERVENTION OUTCOME
verbalizes understanding/nbn demonstrated  deep latch and  performed  with sucking and swallowing  noted .   reviewed  breastfeeding  log  and daily  nbn  goals.   offered  assistance as needed.

## 2019-08-20 NOTE — OB PROVIDER IHI INDUCTION/AUGMENTATION NOTE - NS_CHECKALL_OBGYN_ALL_OB
FHR was reviewed/Order was written/Induction / Augmentation was discussed/Contractions pattern was reviewed/H&P was completed

## 2019-08-21 ENCOUNTER — TRANSCRIPTION ENCOUNTER (OUTPATIENT)
Age: 34
End: 2019-08-21

## 2019-08-21 LAB
HBV CORE IGM SER-ACNC: NONREACTIVE — SIGNIFICANT CHANGE UP
HCV AB S/CO SERPL IA: 0.1 S/CO — SIGNIFICANT CHANGE UP (ref 0–0.99)
HCV AB SERPL-IMP: SIGNIFICANT CHANGE UP

## 2019-08-21 RX ORDER — IBUPROFEN 200 MG
1 TABLET ORAL
Qty: 0 | Refills: 0 | DISCHARGE
Start: 2019-08-21

## 2019-08-21 RX ADMIN — Medication 975 MILLIGRAM(S): at 09:36

## 2019-08-21 RX ADMIN — Medication 975 MILLIGRAM(S): at 10:30

## 2019-08-21 RX ADMIN — Medication 1 TABLET(S): at 09:37

## 2019-08-21 RX ADMIN — Medication 975 MILLIGRAM(S): at 19:45

## 2019-08-21 RX ADMIN — Medication 600 MILLIGRAM(S): at 14:08

## 2019-08-21 RX ADMIN — Medication 600 MILLIGRAM(S): at 15:00

## 2019-08-21 RX ADMIN — Medication 975 MILLIGRAM(S): at 18:49

## 2019-08-21 NOTE — DISCHARGE NOTE OB - CARE PLAN
Principal Discharge DX:	NVD (normal vaginal delivery)  Goal:	recovery  Assessment and plan of treatment:	pelvic rest, regular diet  Secondary Diagnosis:	Hepatitis B

## 2019-08-21 NOTE — PROVIDER CONTACT NOTE (OTHER) - ASSESSMENT
vital signs stable ,pt denies any chest pain ,SOB ,dizziness, few small veins on back/side of lower leg visible ,no redness, area soft, denies any calf pain .mild edema of lower extremities

## 2019-08-21 NOTE — DISCHARGE NOTE OB - PATIENT PORTAL LINK FT
You can access the [facility] Portal, offered by [facility], by registering with the following website: [email or web address]

## 2019-08-21 NOTE — DISCHARGE NOTE OB - CARE PROVIDER_API CALL
Rafael Mahajan)  MaternalFetal Medicine; Obstetrics and Gynecology  [address] [address] [place] [address], Room [address]  [place] [address]  Phone: [phone]  Fax: [phone]  Follow Up Time:

## 2019-08-21 NOTE — PROGRESS NOTE ADULT - SUBJECTIVE AND OBJECTIVE BOX
post epidural d/c followup:    pt states freely voiding and ambulating.  Denies headeache/nausea/vomiting.  VSS.  States no concerns at this time      ICU Vital Signs Last 24 Hrs  T(C): 36.7 (21 Aug 2019 05:45), Max: 37.2 (20 Aug 2019 22:22)  T(F): 98.1 (21 Aug 2019 05:45), Max: 98.9 (20 Aug 2019 22:22)  HR: 85 (21 Aug 2019 05:45) (68 - 119)  BP: 109/55 (21 Aug 2019 05:45) (97/64 - 187/68)  BP(mean): --  ABP: --  ABP(mean): --  RR: 18 (21 Aug 2019 05:45) (15 - 18)  SpO2: 98% (21 Aug 2019 05:45) (97% - 100%)

## 2019-08-21 NOTE — DISCHARGE NOTE OB - MATERIALS PROVIDED
Discharge Medication Information for Patients and Families Pocket Guide/[facility] Hearing Screen Program/[place]  Immunization Record/Breastfeeding Log/Back To Sleep Handout/[facility] [place] Screening Program/Bottle Feeding Log/Breastfeeding Mother’s Support Group Information/Guide to Postpartum Care/Shaken Baby Prevention Handout/Breastfeeding Guide and Packet/Birth Certificate Instructions

## 2019-08-21 NOTE — CHART NOTE - NSCHARTNOTEFT_GEN_A_CORE
s/p NVD PPD#1  Patient is a chronic Hep B carrier, Hep C weakly positive one time then repeat one negative as per prenatal chart. Patient was follow up in the office with routine LFTs  As per patient seen by hepatologist during pregnancy and no treatment needed  Patient is asymptomatic  Patient seen by Dr Briggs today  Ordered Hepatitis panel  Patient also made a follow up appointment on Sept 3,2019 with Hepatologist Dr Amanuel Mosley ()

## 2019-08-22 VITALS
RESPIRATION RATE: 18 BRPM | SYSTOLIC BLOOD PRESSURE: 120 MMHG | TEMPERATURE: 98 F | DIASTOLIC BLOOD PRESSURE: 85 MMHG | OXYGEN SATURATION: 98 % | HEART RATE: 70 BPM

## 2019-08-22 RX ADMIN — Medication 1 TABLET(S): at 08:31

## 2019-08-22 RX ADMIN — Medication 600 MILLIGRAM(S): at 08:31

## 2019-08-22 RX ADMIN — Medication 975 MILLIGRAM(S): at 01:26

## 2019-08-22 RX ADMIN — Medication 975 MILLIGRAM(S): at 01:56

## 2019-08-22 RX ADMIN — Medication 600 MILLIGRAM(S): at 09:30

## 2019-08-22 NOTE — PROGRESS NOTE ADULT - SUBJECTIVE AND OBJECTIVE BOX
Subjective  Pain: well controlled  Complaints: none  Milestones: Alert and orientedx3. Out of bed ambulating. Voiding/Due to void. Tolerating a regular diet.  Infant feeding:  breast                               Feeding related issues or concerns: none    Objective   T(C): 36.6 (19 @ 05:58), Max: 36.9 (19 @ 17:57)  HR: 70 (19 @ 05:58) (70 - 82)  BP: 120/85 (19 @ 05:58) (115/82 - 120/85)  RR: 18 (19 @ 05:58) (15 - 18)  SpO2: 98% (19 @ 05:58) (98% - 98%)  Wt(kg): --    Assessment      34 y/o   .Day # 2 postpartum.   Past medical history significant for Hepatitis B carrier, Hep C NR  Current Issues: none  Breasts:soft  Abdomen: Soft, nontender, nondistended.  Vagina: Lochia moderate  Perineum: 2nd degree laceration,  Laceration/episiotomy site: clean  Lower extremities: No edema noted bilaterally of lower extremities. Nontender calves.  Negative Keke's sign. Positive pedal pulses.  Other relevant physical exam findings;none      Plan  Plan: Increase ambulation, analgesia PRN and pain medication protocal standing oxycodone, ibuprofen and acetaminophen.  Diet: Continue regular diet  Labs: Repeat labs Hep B positive, core antibody non reactive, Hep C non reactive  Follow up with Hepatologist Dr Mosley in [place] on 9/3/19  d/c home

## 2019-08-27 LAB — HBV E AG SER-ACNC: NEGATIVE — SIGNIFICANT CHANGE UP

## 2019-09-05 LAB — HBV E AB SER-ACNC: POSITIVE — SIGNIFICANT CHANGE UP

## 2019-10-04 PROBLEM — N97.9 FEMALE INFERTILITY, UNSPECIFIED: Chronic | Status: ACTIVE | Noted: 2019-08-19

## 2019-10-04 PROBLEM — O02.1 MISSED ABORTION: Chronic | Status: ACTIVE | Noted: 2019-08-19

## 2019-10-04 PROBLEM — B19.10 UNSPECIFIED VIRAL HEPATITIS B WITHOUT HEPATIC COMA: Chronic | Status: ACTIVE | Noted: 2019-08-19

## 2019-10-10 ENCOUNTER — APPOINTMENT (OUTPATIENT)
Dept: OBGYN | Facility: CLINIC | Age: 34
End: 2019-10-10
Payer: COMMERCIAL

## 2019-10-10 PROCEDURE — 0503F POSTPARTUM CARE VISIT: CPT

## 2020-01-06 ENCOUNTER — APPOINTMENT (OUTPATIENT)
Dept: OBGYN | Facility: CLINIC | Age: 35
End: 2020-01-06
Payer: COMMERCIAL

## 2020-01-06 PROCEDURE — 99395 PREV VISIT EST AGE 18-39: CPT

## 2020-05-29 ENCOUNTER — APPOINTMENT (OUTPATIENT)
Dept: OBGYN | Facility: CLINIC | Age: 35
End: 2020-05-29

## 2020-06-12 ENCOUNTER — APPOINTMENT (OUTPATIENT)
Dept: ANTEPARTUM | Facility: CLINIC | Age: 35
End: 2020-06-12
Payer: COMMERCIAL

## 2020-06-12 PROCEDURE — 76801 OB US < 14 WKS SINGLE FETUS: CPT

## 2020-07-10 ENCOUNTER — APPOINTMENT (OUTPATIENT)
Dept: ANTEPARTUM | Facility: CLINIC | Age: 35
End: 2020-07-10
Payer: COMMERCIAL

## 2020-07-10 PROCEDURE — 76813 OB US NUCHAL MEAS 1 GEST: CPT

## 2020-07-10 PROCEDURE — 76816 OB US FOLLOW-UP PER FETUS: CPT

## 2020-07-10 PROCEDURE — 76801 OB US < 14 WKS SINGLE FETUS: CPT

## 2020-07-16 ENCOUNTER — APPOINTMENT (OUTPATIENT)
Dept: OBGYN | Facility: CLINIC | Age: 35
End: 2020-07-16
Payer: COMMERCIAL

## 2020-07-16 PROCEDURE — 0502F SUBSEQUENT PRENATAL CARE: CPT

## 2020-08-07 ENCOUNTER — APPOINTMENT (OUTPATIENT)
Dept: OBGYN | Facility: CLINIC | Age: 35
End: 2020-08-07
Payer: COMMERCIAL

## 2020-08-07 PROCEDURE — 0502F SUBSEQUENT PRENATAL CARE: CPT

## 2020-09-04 ENCOUNTER — APPOINTMENT (OUTPATIENT)
Dept: ANTEPARTUM | Facility: CLINIC | Age: 35
End: 2020-09-04
Payer: COMMERCIAL

## 2020-09-04 PROCEDURE — 76817 TRANSVAGINAL US OBSTETRIC: CPT

## 2020-09-04 PROCEDURE — 76811 OB US DETAILED SNGL FETUS: CPT

## 2020-10-03 ENCOUNTER — APPOINTMENT (OUTPATIENT)
Dept: OBGYN | Facility: CLINIC | Age: 35
End: 2020-10-03
Payer: COMMERCIAL

## 2020-10-03 PROCEDURE — 0502F SUBSEQUENT PRENATAL CARE: CPT

## 2020-10-13 ENCOUNTER — APPOINTMENT (OUTPATIENT)
Dept: OBGYN | Facility: CLINIC | Age: 35
End: 2020-10-13
Payer: COMMERCIAL

## 2020-10-13 PROCEDURE — 0502F SUBSEQUENT PRENATAL CARE: CPT

## 2020-10-30 ENCOUNTER — APPOINTMENT (OUTPATIENT)
Dept: OBGYN | Facility: CLINIC | Age: 35
End: 2020-10-30

## 2020-10-30 ENCOUNTER — APPOINTMENT (OUTPATIENT)
Dept: ANTEPARTUM | Facility: CLINIC | Age: 35
End: 2020-10-30
Payer: COMMERCIAL

## 2020-10-30 PROCEDURE — 76816 OB US FOLLOW-UP PER FETUS: CPT

## 2020-10-30 PROCEDURE — 76820 UMBILICAL ARTERY ECHO: CPT

## 2020-10-30 PROCEDURE — 99072 ADDL SUPL MATRL&STAF TM PHE: CPT

## 2020-10-30 PROCEDURE — 76819 FETAL BIOPHYS PROFIL W/O NST: CPT

## 2020-11-13 ENCOUNTER — APPOINTMENT (OUTPATIENT)
Dept: OBGYN | Facility: CLINIC | Age: 35
End: 2020-11-13
Payer: COMMERCIAL

## 2020-11-13 PROCEDURE — 0502F SUBSEQUENT PRENATAL CARE: CPT

## 2020-11-27 ENCOUNTER — APPOINTMENT (OUTPATIENT)
Dept: OBGYN | Facility: CLINIC | Age: 35
End: 2020-11-27
Payer: COMMERCIAL

## 2020-11-27 PROCEDURE — 0502F SUBSEQUENT PRENATAL CARE: CPT

## 2020-12-04 ENCOUNTER — OUTPATIENT (OUTPATIENT)
Dept: INPATIENT UNIT | Facility: HOSPITAL | Age: 35
LOS: 1 days | Discharge: HOME | End: 2020-12-04
Payer: MEDICAID

## 2020-12-04 VITALS — SYSTOLIC BLOOD PRESSURE: 118 MMHG | DIASTOLIC BLOOD PRESSURE: 69 MMHG | HEART RATE: 89 BPM

## 2020-12-04 VITALS — SYSTOLIC BLOOD PRESSURE: 121 MMHG | HEART RATE: 95 BPM | DIASTOLIC BLOOD PRESSURE: 75 MMHG

## 2020-12-04 DIAGNOSIS — Z98.890 OTHER SPECIFIED POSTPROCEDURAL STATES: Chronic | ICD-10-CM

## 2020-12-04 DIAGNOSIS — Z3A.00 WEEKS OF GESTATION OF PREGNANCY NOT SPECIFIED: ICD-10-CM

## 2020-12-04 DIAGNOSIS — N97.9 FEMALE INFERTILITY, UNSPECIFIED: Chronic | ICD-10-CM

## 2020-12-04 DIAGNOSIS — Z90.79 ACQUIRED ABSENCE OF OTHER GENITAL ORGAN(S): Chronic | ICD-10-CM

## 2020-12-04 DIAGNOSIS — O26.899 OTHER SPECIFIED PREGNANCY RELATED CONDITIONS, UNSPECIFIED TRIMESTER: ICD-10-CM

## 2020-12-04 LAB
APPEARANCE UR: CLEAR — SIGNIFICANT CHANGE UP
BILIRUB UR-MCNC: NEGATIVE — SIGNIFICANT CHANGE UP
BLOOD UR QL VISUAL: NEGATIVE — SIGNIFICANT CHANGE UP
COLOR SPEC: YELLOW — SIGNIFICANT CHANGE UP
GLUCOSE UR-MCNC: NEGATIVE — SIGNIFICANT CHANGE UP
KETONES UR-MCNC: NEGATIVE — SIGNIFICANT CHANGE UP
LEUKOCYTE ESTERASE UR-ACNC: NEGATIVE — SIGNIFICANT CHANGE UP
NITRITE UR-MCNC: NEGATIVE — SIGNIFICANT CHANGE UP
PH UR: 6.5 — SIGNIFICANT CHANGE UP (ref 5–8)
PROT UR-MCNC: 10 — SIGNIFICANT CHANGE UP
SP GR SPEC: 1.02 — SIGNIFICANT CHANGE UP (ref 1–1.04)
UROBILINOGEN FLD QL: NORMAL — SIGNIFICANT CHANGE UP

## 2020-12-04 PROCEDURE — 76818 FETAL BIOPHYS PROFILE W/NST: CPT | Mod: 26

## 2020-12-04 PROCEDURE — 99214 OFFICE O/P EST MOD 30 MIN: CPT

## 2020-12-04 RX ORDER — DOCUSATE SODIUM 100 MG
1 CAPSULE ORAL
Qty: 60 | Refills: 0
Start: 2020-12-04 | End: 2021-01-02

## 2020-12-04 NOTE — OB PROVIDER TRIAGE NOTE - NS_OBGYNHISTORY_OBGYN_ALL_OB_FT
2007 FT  uncomplicated 7#   2019 FT  uncomplicated 7#15   L salpingectomy after infection   SAB x 2, 1 complete, 1 d&c

## 2020-12-04 NOTE — OB RN TRIAGE NOTE - PMH
Hepatitis B  chronic Hep B  Infertility, female    Miscarriage    Missed     Normal vaginal delivery  2007

## 2020-12-04 NOTE — OB RN TRIAGE NOTE - NSNURSINGINSTR_OBGYN_ALL_OB_FT
pt evaluated for ptl.  no evidence on evaluation, pt d/c to home with instructions given by mohan bailey.

## 2020-12-04 NOTE — OB RN TRIAGE NOTE - PSH
Female infertility  s/p 1 IVF Dec 3rd 2019  H/O unilateral salpingectomy    S/P D&C (status post dilation and curettage)

## 2020-12-04 NOTE — OB PROVIDER TRIAGE NOTE - ADDITIONAL INSTRUCTIONS
-Cleared for discharge home   -Use abdominal support   -keep scheduled appt   -fetal kick count reviewed   -warning signs reviewed

## 2020-12-04 NOTE — OB PROVIDER TRIAGE NOTE - HISTORY OF PRESENT ILLNESS
33 yo  33 5/7 weeks with complaints of intermittent lower abdominal pain, lower back pain x 1 week that worsened yesterday. Pt reports pain wraps around lower abdomen, and shoots down the legs. Worse when walking/ moving, she has had difficulty walking, better when laying down. Pt reports pain of 4/10 currently.     Current a/p complications: GDMA1     Allergies: NKDA  Medications: PNV  PMH: Chronic Hep B   PSH: L salpingectomy 2018, D&C x 1   OB/GYN: 2007 FT  uncomplicated 7#   2019 FT  uncomplicated 7#15   L salpingectomy after infection   SAB x 2, 1 complete, 1 d&c   Social: Denies   Psychosocial: Denies

## 2020-12-04 NOTE — OB PROVIDER TRIAGE NOTE - NSHPLABSRESULTS_GEN_ALL_CORE
UA pending Urinalysis Basic - ( 04 Dec 2020 19:33 )    Color: YELLOW / Appearance: CLEAR / S.017 / pH: 6.5  Gluc: NEGATIVE / Ketone: NEGATIVE  / Bili: NEGATIVE / Urobili: NORMAL   Blood: NEGATIVE / Protein: 10 / Nitrite: NEGATIVE   Leuk Esterase: NEGATIVE / RBC: x / WBC x   Sq Epi: x / Non Sq Epi: x / Bacteria: x

## 2020-12-04 NOTE — OB PROVIDER TRIAGE NOTE - NSHPPHYSICALEXAM_GEN_ALL_CORE
Vital Signs Last 24 Hrs  T(C): 37.3 (04 Dec 2020 19:28), Max: 37.3 (04 Dec 2020 19:28)  T(F): 99.1 (04 Dec 2020 19:28), Max: 99.1 (04 Dec 2020 19:28)  HR: 86 (04 Dec 2020 19:56) (86 - 95)  BP: 112/70 (04 Dec 2020 19:56) (112/70 - 121/75)  RR: 16 (04 Dec 2020 19:28) (16 - 16)    Abdomen soft, nontender   no cva tenderness     TAUS cephalic presentation, anterior placenta, tereza 13.09 cm, bpp 8/8     SSE cervix appears closed     TVUS CL 2.10-2.71 cm. mild funneling, no dynamic changes     NST category 1 tracing   irregular contractions by toco

## 2020-12-04 NOTE — OB PROVIDER TRIAGE NOTE - NSOBPROVIDERNOTE_OBGYN_ALL_OB_FT
Likely round ligament pain     d/w Dr. Kay     -Cleared for discharge home   -Use abdominal support   -keep scheduled appt   -fetal kick count reviewed   -warning signs reviewed

## 2020-12-10 PROBLEM — O03.9 COMPLETE OR UNSPECIFIED SPONTANEOUS ABORTION WITHOUT COMPLICATION: Chronic | Status: ACTIVE | Noted: 2020-12-04

## 2020-12-11 ENCOUNTER — APPOINTMENT (OUTPATIENT)
Dept: ANTEPARTUM | Facility: CLINIC | Age: 35
End: 2020-12-11
Payer: COMMERCIAL

## 2020-12-11 PROCEDURE — 76816 OB US FOLLOW-UP PER FETUS: CPT

## 2020-12-11 PROCEDURE — 99072 ADDL SUPL MATRL&STAF TM PHE: CPT

## 2020-12-11 PROCEDURE — 76819 FETAL BIOPHYS PROFIL W/O NST: CPT

## 2020-12-18 ENCOUNTER — APPOINTMENT (OUTPATIENT)
Dept: OBGYN | Facility: CLINIC | Age: 35
End: 2020-12-18
Payer: COMMERCIAL

## 2020-12-18 PROCEDURE — 0502F SUBSEQUENT PRENATAL CARE: CPT

## 2021-01-04 ENCOUNTER — APPOINTMENT (OUTPATIENT)
Dept: OBGYN | Facility: CLINIC | Age: 36
End: 2021-01-04
Payer: COMMERCIAL

## 2021-01-04 PROCEDURE — 76819 FETAL BIOPHYS PROFIL W/O NST: CPT

## 2021-01-04 PROCEDURE — 99072 ADDL SUPL MATRL&STAF TM PHE: CPT

## 2021-01-04 PROCEDURE — 76820 UMBILICAL ARTERY ECHO: CPT

## 2021-01-04 PROCEDURE — 76816 OB US FOLLOW-UP PER FETUS: CPT

## 2021-01-07 ENCOUNTER — TRANSCRIPTION ENCOUNTER (OUTPATIENT)
Age: 36
End: 2021-01-07

## 2021-01-07 ENCOUNTER — INPATIENT (INPATIENT)
Facility: HOSPITAL | Age: 36
LOS: 0 days | Discharge: HOME | End: 2021-01-07
Attending: OBSTETRICS & GYNECOLOGY | Admitting: OBSTETRICS & GYNECOLOGY

## 2021-01-07 VITALS — DIASTOLIC BLOOD PRESSURE: 62 MMHG | HEART RATE: 83 BPM | SYSTOLIC BLOOD PRESSURE: 107 MMHG

## 2021-01-07 VITALS — DIASTOLIC BLOOD PRESSURE: 66 MMHG | HEART RATE: 72 BPM | SYSTOLIC BLOOD PRESSURE: 112 MMHG

## 2021-01-07 DIAGNOSIS — N97.9 FEMALE INFERTILITY, UNSPECIFIED: Chronic | ICD-10-CM

## 2021-01-07 DIAGNOSIS — Z90.79 ACQUIRED ABSENCE OF OTHER GENITAL ORGAN(S): Chronic | ICD-10-CM

## 2021-01-07 DIAGNOSIS — O26.899 OTHER SPECIFIED PREGNANCY RELATED CONDITIONS, UNSPECIFIED TRIMESTER: ICD-10-CM

## 2021-01-07 DIAGNOSIS — Z98.890 OTHER SPECIFIED POSTPROCEDURAL STATES: Chronic | ICD-10-CM

## 2021-01-07 DIAGNOSIS — Z3A.00 WEEKS OF GESTATION OF PREGNANCY NOT SPECIFIED: ICD-10-CM

## 2021-01-07 LAB
ALBUMIN SERPL ELPH-MCNC: 3.1 G/DL — LOW (ref 3.3–5)
ALP SERPL-CCNC: 110 U/L — SIGNIFICANT CHANGE UP (ref 40–120)
ALT FLD-CCNC: 15 U/L — SIGNIFICANT CHANGE UP (ref 4–33)
ANION GAP SERPL CALC-SCNC: 12 MMOL/L — SIGNIFICANT CHANGE UP (ref 7–14)
AST SERPL-CCNC: 16 U/L — SIGNIFICANT CHANGE UP (ref 4–32)
BASOPHILS # BLD AUTO: 0.02 K/UL — SIGNIFICANT CHANGE UP (ref 0–0.2)
BASOPHILS NFR BLD AUTO: 0.3 % — SIGNIFICANT CHANGE UP (ref 0–2)
BILIRUB SERPL-MCNC: 0.3 MG/DL — SIGNIFICANT CHANGE UP (ref 0.2–1.2)
BLD GP AB SCN SERPL QL: NEGATIVE — SIGNIFICANT CHANGE UP
BUN SERPL-MCNC: 7 MG/DL — SIGNIFICANT CHANGE UP (ref 7–23)
CALCIUM SERPL-MCNC: 9.1 MG/DL — SIGNIFICANT CHANGE UP (ref 8.4–10.5)
CHLORIDE SERPL-SCNC: 107 MMOL/L — SIGNIFICANT CHANGE UP (ref 98–107)
CO2 SERPL-SCNC: 19 MMOL/L — LOW (ref 22–31)
CREAT SERPL-MCNC: 0.44 MG/DL — LOW (ref 0.5–1.3)
EOSINOPHIL # BLD AUTO: 0.11 K/UL — SIGNIFICANT CHANGE UP (ref 0–0.5)
EOSINOPHIL NFR BLD AUTO: 1.4 % — SIGNIFICANT CHANGE UP (ref 0–6)
GLUCOSE BLDC GLUCOMTR-MCNC: 78 MG/DL — SIGNIFICANT CHANGE UP (ref 70–99)
GLUCOSE SERPL-MCNC: 91 MG/DL — SIGNIFICANT CHANGE UP (ref 70–99)
HCT VFR BLD CALC: 38.1 % — SIGNIFICANT CHANGE UP (ref 34.5–45)
HGB BLD-MCNC: 12.5 G/DL — SIGNIFICANT CHANGE UP (ref 11.5–15.5)
IANC: 4.45 K/UL — SIGNIFICANT CHANGE UP (ref 1.5–8.5)
IMM GRANULOCYTES NFR BLD AUTO: 1.4 % — SIGNIFICANT CHANGE UP (ref 0–1.5)
LYMPHOCYTES # BLD AUTO: 2.51 K/UL — SIGNIFICANT CHANGE UP (ref 1–3.3)
LYMPHOCYTES # BLD AUTO: 32 % — SIGNIFICANT CHANGE UP (ref 13–44)
MCHC RBC-ENTMCNC: 32.1 PG — SIGNIFICANT CHANGE UP (ref 27–34)
MCHC RBC-ENTMCNC: 32.8 GM/DL — SIGNIFICANT CHANGE UP (ref 32–36)
MCV RBC AUTO: 97.9 FL — SIGNIFICANT CHANGE UP (ref 80–100)
MONOCYTES # BLD AUTO: 0.64 K/UL — SIGNIFICANT CHANGE UP (ref 0–0.9)
MONOCYTES NFR BLD AUTO: 8.2 % — SIGNIFICANT CHANGE UP (ref 2–14)
NEUTROPHILS # BLD AUTO: 4.45 K/UL — SIGNIFICANT CHANGE UP (ref 1.8–7.4)
NEUTROPHILS NFR BLD AUTO: 56.7 % — SIGNIFICANT CHANGE UP (ref 43–77)
NRBC # BLD: 0 /100 WBCS — SIGNIFICANT CHANGE UP
NRBC # FLD: 0 K/UL — SIGNIFICANT CHANGE UP
PLATELET # BLD AUTO: 288 K/UL — SIGNIFICANT CHANGE UP (ref 150–400)
POTASSIUM SERPL-MCNC: 3.5 MMOL/L — SIGNIFICANT CHANGE UP (ref 3.5–5.3)
POTASSIUM SERPL-SCNC: 3.5 MMOL/L — SIGNIFICANT CHANGE UP (ref 3.5–5.3)
PROT SERPL-MCNC: 5.8 G/DL — LOW (ref 6–8.3)
RBC # BLD: 3.89 M/UL — SIGNIFICANT CHANGE UP (ref 3.8–5.2)
RBC # FLD: 12.9 % — SIGNIFICANT CHANGE UP (ref 10.3–14.5)
RH IG SCN BLD-IMP: POSITIVE — SIGNIFICANT CHANGE UP
RUBV IGG SER-ACNC: 1.4 INDEX — SIGNIFICANT CHANGE UP
RUBV IGG SER-IMP: POSITIVE — SIGNIFICANT CHANGE UP
SARS-COV-2 RNA SPEC QL NAA+PROBE: SIGNIFICANT CHANGE UP
SODIUM SERPL-SCNC: 138 MMOL/L — SIGNIFICANT CHANGE UP (ref 135–145)
T PALLIDUM AB TITR SER: NEGATIVE — SIGNIFICANT CHANGE UP
WBC # BLD: 7.84 K/UL — SIGNIFICANT CHANGE UP (ref 3.8–10.5)
WBC # FLD AUTO: 7.84 K/UL — SIGNIFICANT CHANGE UP (ref 3.8–10.5)

## 2021-01-07 RX ORDER — AMPICILLIN TRIHYDRATE 250 MG
1 CAPSULE ORAL EVERY 4 HOURS
Refills: 0 | Status: DISCONTINUED | OUTPATIENT
Start: 2021-01-07 | End: 2021-01-07

## 2021-01-07 RX ORDER — OXYTOCIN 10 UNIT/ML
VIAL (ML) INJECTION
Qty: 20 | Refills: 0 | Status: DISCONTINUED | OUTPATIENT
Start: 2021-01-07 | End: 2021-01-07

## 2021-01-07 RX ORDER — AMPICILLIN TRIHYDRATE 250 MG
2 CAPSULE ORAL ONCE
Refills: 0 | Status: COMPLETED | OUTPATIENT
Start: 2021-01-07 | End: 2021-01-07

## 2021-01-07 RX ORDER — AMPICILLIN TRIHYDRATE 250 MG
CAPSULE ORAL
Refills: 0 | Status: DISCONTINUED | OUTPATIENT
Start: 2021-01-07 | End: 2021-01-07

## 2021-01-07 RX ORDER — SODIUM CHLORIDE 9 MG/ML
1000 INJECTION, SOLUTION INTRAVENOUS
Refills: 0 | Status: DISCONTINUED | OUTPATIENT
Start: 2021-01-07 | End: 2021-01-07

## 2021-01-07 RX ADMIN — Medication 216 GRAM(S): at 05:45

## 2021-01-07 RX ADMIN — SODIUM CHLORIDE 125 MILLILITER(S): 9 INJECTION, SOLUTION INTRAVENOUS at 07:40

## 2021-01-07 NOTE — DISCHARGE NOTE OB - PATIENT PORTAL LINK FT
You can access the FollowMyHealth Patient Portal offered by [facility] by registering at the following website: [email or web address]. By joining [employer]’s FollowMyHealth portal, you will also be able to view your health information using other applications (apps) compatible with our system.

## 2021-01-07 NOTE — CHART NOTE - NSCHARTNOTEFT_GEN_A_CORE
S:  Pt seen and examined for cervical change due to very infrequent ctx since admission    O:   T(C): 36.9 (07:13)  HR: 83 (09:30)  BP: 109/69 (07:17)  RR: 16 (06:05)  SpO2: --  SVE: 4-5/60/-3, intact  EFM: baseline 130 mod mikhail +accel no decel  [place]: q15 min/rare     A/P: 35y P2 presenting overnight with contractions. Found to be 4 cm dilated.  -pt reexamined and is same exam 5 hours later with rare ctx  - counseled pt that it is possible to have advanced exam as multiparous pt and does not appear to be in labor at this time.  as she is not 39w yet, augmentation would not be recommended.  recommend d/c home and return to hospital when she has ctx q5min for 1 hour    TLal PGY4  dw Dr Morales

## 2021-01-07 NOTE — OB PROVIDER H&P - ATTENDING COMMENTS
Agree with above H&P. Patient is a P2 presenting in term labor, history of chronic hepB, GDMA1. Expectant management.    SEFERINO Horan MD

## 2021-01-07 NOTE — DISCHARGE NOTE ANTEPARTUM - CARE PLAN
Principal Discharge DX:	38 weeks gestation of pregnancy  Goal:	labor  Assessment and plan of treatment:	labor precautions

## 2021-01-07 NOTE — DISCHARGE NOTE ANTEPARTUM - HAS THE PATIENT RECEIVED THE INFLUENZA VACCINE THIS SEASON?
Subjective:      Patient ID: Theo Reno is a 66 y.o. female.     HPI    Review of Systems    Objective:   Physical Exam    Assessment:      ***      Plan:      *** no...

## 2021-01-07 NOTE — DISCHARGE NOTE ANTEPARTUM - PATIENT PORTAL LINK FT
You can access the FollowMyHealth Patient Portal offered by [facility] by registering at the following website: [email or web address]. By joining [employer]’s FollowMyHealth portal, you will also be able to view your health information using other applications (apps) compatible with our system.

## 2021-01-07 NOTE — DISCHARGE NOTE OB - CARE PROVIDER_API CALL
Rafael Mahajan  MATERNAL/FETAL MEDICINE  [address] [place] [address]  Phone: [phone]  Fax: [phone]  Established Patient  Follow Up Time:

## 2021-01-07 NOTE — OB PROVIDER TRIAGE NOTE - NS_FETALANOMAL_OBGYN_ALL_OB
Returned call to the pharmacy. Advised pharmacist patient has taking Keflex previously and was advised allergy warning as follows:  I told the patient that if the patient develops lip swelling tongue swelling wheezing or shortness of breath the patient should be brought immediately to emergency room.    The pharmacist was okay with the Keflex as long as it was documented the patient has had it previously and that Dr. Omer was already aware.     Dr. Omer, for your information.    No

## 2021-01-07 NOTE — DISCHARGE NOTE ANTEPARTUM - YES, WALK AS TOLERATED
October 8, 2018     Patient: Reyes Mendoza   YOB: 1999   Date of Visit: 10/8/2018       To Whom it May Concern:    Reyes Mendoza is under my professional care  He was seen in my office on 10/8/2018  He may return to play sports and gym when he completes return to play protocol with [employer]  If you have any questions or concerns, please don't hesitate to call           Sincerely,          Kimi Schwartz III, DO        CC: No Recipients Statement Selected

## 2021-01-07 NOTE — OB PROVIDER H&P - ASSESSMENT
Vital Signs Last 24 Hrs  T(C): 36.7 (07 Jan 2021 04:20), Max: 36.7 (07 Jan 2021 04:20)  T(F): 98.1 (07 Jan 2021 04:20), Max: 98.1 (07 Jan 2021 04:20)  HR: 72 (07 Jan 2021 04:20) (72 - 72)  BP: 112/66 (07 Jan 2021 04:20) (112/66 - 112/66)  BP(mean): --  RR: 17 (07 Jan 2021 04:20) (17 - 17)  SpO2: --    General: A&O x3  Abdomen: soft, non tender. no guarding or rebound tenderness  SVE: 4/60/-3  TAS: vertex presentation   EFW 3317gm by Leopold's     NST reactive with moderate variability, cat 1  toco irregular ctx    d/w with Dr Horan and Dr French  Plan:   -Admit l&d. Routine labs  -Expectant management of labor   -Fetus: cat 1 tracing, vertex presentation, continuous monitoring  -HepB + protocol   -Ampicillin for GBS Prophylaxis  -Covid 19 pending for patient and support person  -Consents signed and witnessed at bedside

## 2021-01-07 NOTE — DISCHARGE NOTE OB - CARE PLAN
Principal Discharge DX:	Pregnancy  Goal:	Continued prenatal care  Assessment and plan of treatment:	Please return to [facility] labor and delivery triage if you experience regular and painful contractions, decreased fetal movement, vaginal bleeding or loss of fluid. Please follow up with your OBGYN as scheduled.

## 2021-01-07 NOTE — OB PROVIDER TRIAGE NOTE - NSHPPHYSICALEXAM_GEN_ALL_CORE
Vital Signs Last 24 Hrs  T(C): 36.7 (07 Jan 2021 04:20), Max: 36.7 (07 Jan 2021 04:20)  T(F): 98.1 (07 Jan 2021 04:20), Max: 98.1 (07 Jan 2021 04:20)  HR: 72 (07 Jan 2021 04:20) (72 - 72)  BP: 112/66 (07 Jan 2021 04:20) (112/66 - 112/66)  BP(mean): --  RR: 17 (07 Jan 2021 04:20) (17 - 17)  SpO2: --    General: A&O x3  Abdomen: soft, non tender. no guarding or rebound tenderness  SVE: 4/60/-3  TAS: vertex presentation   EFW 3317gm by Leopold's     NST reactive with moderate variability, cat 1  toco irregular ctx

## 2021-01-07 NOTE — OB PROVIDER TRIAGE NOTE - NSOBPROVIDERNOTE_OBGYN_ALL_OB_FT
Vital Signs Last 24 Hrs  T(C): 36.7 (07 Jan 2021 04:20), Max: 36.7 (07 Jan 2021 04:20)  T(F): 98.1 (07 Jan 2021 04:20), Max: 98.1 (07 Jan 2021 04:20)  HR: 72 (07 Jan 2021 04:20) (72 - 72)  BP: 112/66 (07 Jan 2021 04:20) (112/66 - 112/66)  BP(mean): --  RR: 17 (07 Jan 2021 04:20) (17 - 17)  SpO2: --    General: A&O x3  Abdomen: soft, non tender. no guarding or rebound tenderness  SVE: 4/60/-3  TAS: vertex presentation   EFW 3317gm by Leopold's     NST reactive with moderate variability, cat 1  toco irregular ctx    d/w with Dr Horan and Dr French  Plan:   -Admit l&d. Routine labs  -Expectant management of labor   -Fetus: cat 1 tracing, vertex presentation, continuous monitoring  -HepB + standard protocol   -Ampicillin for GBS Prophylaxis  -Covid 19 pending for patient and support person  -Consents signed and witnessed at bedside

## 2021-01-07 NOTE — DISCHARGE NOTE OB - PLAN OF CARE
Continued prenatal care Please return to [facility] labor and delivery triage if you experience regular and painful contractions, decreased fetal movement, vaginal bleeding or loss of fluid. Please follow up with your OBGYN as scheduled.

## 2021-01-07 NOTE — OB PROVIDER H&P - PROBLEM SELECTOR PLAN 1
-Admit l&d. Routine labs  -Expectant management of labor   -Fetus: cat 1 tracing, vertex presentation, continuous monitoring  -HepB + protocol   -Ampicillin for GBS Prophylaxis  -Covid 19 pending for patient and support person  -Consents signed and witnessed at bedside

## 2021-01-07 NOTE — DISCHARGE NOTE OB - HOSPITAL COURSE
Patient presented to labor and delivery triage with painful contractions, and was found to be 4 centimeters dilated. After admission, her contractions decreased in frequency and intensity. Her cervical exam remained unchanged after 5 hours. She was discharged home, with return precautions.

## 2021-01-07 NOTE — OB PROVIDER H&P - HISTORY OF PRESENT ILLNESS
36 y/o pt 38.4 weeks  presents to triage with c/o painful contractions every 5-10 minutes. pt reports 7/10 pain with contraction. pt denies any LOF and bleeding. pt denies n/v/d, fever or chills. pt endorses +fetal movement.   AP complicated by:  -GDM A1  -Chronic Hepatitis B     NKDA  PMH:   Chronic Hepatitis B- dx in 2019 with last pregnancy  PSH:   Left salpingectomy 18'secondary to infection  OB:   2007 FT 7#   2019 GDM A1 7#1  SAB x2 1 complete, 1 D&C  GYN: denies  Social hx: denies  Medications: PNV

## 2021-01-07 NOTE — OB PROVIDER TRIAGE NOTE - HISTORY OF PRESENT ILLNESS
36 y/o pt 38.4 weeks  presents to triage with c/o painful contractions every 5-10 minutes. pt reports 7/10 pain with contraction. pt denies any LOF and bleeding. pt denies n/v/d, fever or chills. pt endorses +fetal movement.   AP complicated by:  -GDM A1  -Chronic Hepatitis B     NKDA 34 y/o pt 38.4 weeks  presents to triage with c/o painful contractions every 5-10 minutes. pt reports 7/10 pain with contraction. pt denies any LOF and bleeding. pt denies n/v/d, fever or chills. pt endorses +fetal movement.   AP complicated by:  -GDM A1  -Chronic Hepatitis B     NKDA  PMH:   Chronic Hepatitis B- dx in 2019 with last pregnancy  PSH:   Left salpingectomy 18'secondary to infection  OB:   2007 FT 7#   2019 GDM A1 7#1  SAB x2 1 complete, 1 D&C  GYN: denies  Social hx: denies  Medications: PNV

## 2021-01-07 NOTE — DISCHARGE NOTE ANTEPARTUM - MEDICATION SUMMARY - MEDICATIONS TO TAKE
I will START or STAY ON the medications listed below when I get home from the hospital:    PNV Prenatal oral tablet  -- 1 tab(s) by mouth once a day  -- Indication: For Pregnancy

## 2021-01-07 NOTE — DISCHARGE NOTE OB - MEDICATION SUMMARY - MEDICATIONS TO TAKE
I will START or STAY ON the medications listed below when I get home from the hospital:    PNV Prenatal oral tablet  -- 1 tab(s) by mouth once a day  -- Indication: For prenatal vitamin

## 2021-01-07 NOTE — CHART NOTE - NSCHARTNOTEFT_GEN_A_CORE
OBGYN Covering Attending    Pt admitted at 4cm and exam unchanged 5 hrs later.  Cat I FHR tracing.  Discussed with pt we have no medical indication to induce labor at this time so pt to be discharged home and strict labor precautions given.    Devante Morales MD

## 2021-01-07 NOTE — DISCHARGE NOTE ANTEPARTUM - CARE PROVIDER_API CALL
Rafael Mahajan  MATERNAL/FETAL MEDICINE  [address] [place] [address]  Phone: [phone]  Fax: [phone]  Follow Up Time:

## 2021-01-08 LAB
SARS-COV-2 IGG SERPL QL IA: NEGATIVE — SIGNIFICANT CHANGE UP
SARS-COV-2 IGM SERPL IA-ACNC: <0.1 INDEX — SIGNIFICANT CHANGE UP

## 2021-01-11 ENCOUNTER — TRANSCRIPTION ENCOUNTER (OUTPATIENT)
Age: 36
End: 2021-01-11

## 2021-01-11 ENCOUNTER — APPOINTMENT (OUTPATIENT)
Dept: OBGYN | Facility: CLINIC | Age: 36
End: 2021-01-11

## 2021-01-11 ENCOUNTER — INPATIENT (INPATIENT)
Facility: HOSPITAL | Age: 36
LOS: 0 days | Discharge: HOME | End: 2021-01-12
Attending: OBSTETRICS & GYNECOLOGY | Admitting: OBSTETRICS & GYNECOLOGY
Payer: MEDICAID

## 2021-01-11 VITALS
RESPIRATION RATE: 16 BRPM | DIASTOLIC BLOOD PRESSURE: 80 MMHG | SYSTOLIC BLOOD PRESSURE: 125 MMHG | TEMPERATURE: 98 F | HEART RATE: 86 BPM

## 2021-01-11 DIAGNOSIS — O26.899 OTHER SPECIFIED PREGNANCY RELATED CONDITIONS, UNSPECIFIED TRIMESTER: ICD-10-CM

## 2021-01-11 DIAGNOSIS — Z98.890 OTHER SPECIFIED POSTPROCEDURAL STATES: Chronic | ICD-10-CM

## 2021-01-11 DIAGNOSIS — Z90.79 ACQUIRED ABSENCE OF OTHER GENITAL ORGAN(S): Chronic | ICD-10-CM

## 2021-01-11 DIAGNOSIS — Z3A.00 WEEKS OF GESTATION OF PREGNANCY NOT SPECIFIED: ICD-10-CM

## 2021-01-11 DIAGNOSIS — N97.9 FEMALE INFERTILITY, UNSPECIFIED: Chronic | ICD-10-CM

## 2021-01-11 LAB
APTT BLD: 27 SEC — SIGNIFICANT CHANGE UP (ref 27–36.3)
BASOPHILS # BLD AUTO: 0.03 K/UL — SIGNIFICANT CHANGE UP (ref 0–0.2)
BASOPHILS NFR BLD AUTO: 0.4 % — SIGNIFICANT CHANGE UP (ref 0–2)
BLD GP AB SCN SERPL QL: NEGATIVE — SIGNIFICANT CHANGE UP
EOSINOPHIL # BLD AUTO: 0.1 K/UL — SIGNIFICANT CHANGE UP (ref 0–0.5)
EOSINOPHIL NFR BLD AUTO: 1.3 % — SIGNIFICANT CHANGE UP (ref 0–6)
FIBRINOGEN PPP-MCNC: 538 MG/DL — HIGH (ref 290–520)
GLUCOSE BLDC GLUCOMTR-MCNC: 90 MG/DL — SIGNIFICANT CHANGE UP (ref 70–99)
HCT VFR BLD CALC: 38.7 % — SIGNIFICANT CHANGE UP (ref 34.5–45)
HGB BLD-MCNC: 12.7 G/DL — SIGNIFICANT CHANGE UP (ref 11.5–15.5)
HIV 1+2 AB+HIV1 P24 AG SERPL QL IA: SIGNIFICANT CHANGE UP
IANC: 4.19 K/UL — SIGNIFICANT CHANGE UP (ref 1.5–8.5)
IMM GRANULOCYTES NFR BLD AUTO: 1.4 % — SIGNIFICANT CHANGE UP (ref 0–1.5)
INR BLD: 1 RATIO — SIGNIFICANT CHANGE UP (ref 0.88–1.16)
LYMPHOCYTES # BLD AUTO: 2.72 K/UL — SIGNIFICANT CHANGE UP (ref 1–3.3)
LYMPHOCYTES # BLD AUTO: 34.9 % — SIGNIFICANT CHANGE UP (ref 13–44)
MCHC RBC-ENTMCNC: 32.4 PG — SIGNIFICANT CHANGE UP (ref 27–34)
MCHC RBC-ENTMCNC: 32.8 GM/DL — SIGNIFICANT CHANGE UP (ref 32–36)
MCV RBC AUTO: 98.7 FL — SIGNIFICANT CHANGE UP (ref 80–100)
MONOCYTES # BLD AUTO: 0.65 K/UL — SIGNIFICANT CHANGE UP (ref 0–0.9)
MONOCYTES NFR BLD AUTO: 8.3 % — SIGNIFICANT CHANGE UP (ref 2–14)
NEUTROPHILS # BLD AUTO: 4.19 K/UL — SIGNIFICANT CHANGE UP (ref 1.8–7.4)
NEUTROPHILS NFR BLD AUTO: 53.7 % — SIGNIFICANT CHANGE UP (ref 43–77)
NRBC # BLD: 0 /100 WBCS — SIGNIFICANT CHANGE UP
NRBC # FLD: 0 K/UL — SIGNIFICANT CHANGE UP
PLATELET # BLD AUTO: 298 K/UL — SIGNIFICANT CHANGE UP (ref 150–400)
PROTHROM AB SERPL-ACNC: 11.4 SEC — SIGNIFICANT CHANGE UP (ref 10.6–13.6)
RBC # BLD: 3.92 M/UL — SIGNIFICANT CHANGE UP (ref 3.8–5.2)
RBC # FLD: 12.6 % — SIGNIFICANT CHANGE UP (ref 10.3–14.5)
RH IG SCN BLD-IMP: POSITIVE — SIGNIFICANT CHANGE UP
SARS-COV-2 RNA SPEC QL NAA+PROBE: SIGNIFICANT CHANGE UP
T PALLIDUM AB TITR SER: NEGATIVE — SIGNIFICANT CHANGE UP
WBC # BLD: 7.8 K/UL — SIGNIFICANT CHANGE UP (ref 3.8–10.5)
WBC # FLD AUTO: 7.8 K/UL — SIGNIFICANT CHANGE UP (ref 3.8–10.5)

## 2021-01-11 PROCEDURE — 59409 OBSTETRICAL CARE: CPT | Mod: U9

## 2021-01-11 PROCEDURE — 59400 OBSTETRICAL CARE: CPT | Mod: U7

## 2021-01-11 RX ORDER — SODIUM CHLORIDE 9 MG/ML
1000 INJECTION, SOLUTION INTRAVENOUS
Refills: 0 | Status: DISCONTINUED | OUTPATIENT
Start: 2021-01-11 | End: 2021-01-11

## 2021-01-11 RX ORDER — SODIUM CHLORIDE 9 MG/ML
1000 INJECTION INTRAMUSCULAR; INTRAVENOUS; SUBCUTANEOUS
Refills: 0 | Status: DISCONTINUED | OUTPATIENT
Start: 2021-01-11 | End: 2021-01-12

## 2021-01-11 RX ORDER — BENZOCAINE 10 %
1 GEL (GRAM) MUCOUS MEMBRANE EVERY 6 HOURS
Refills: 0 | Status: DISCONTINUED | OUTPATIENT
Start: 2021-01-11 | End: 2021-01-12

## 2021-01-11 RX ORDER — OXYCODONE HYDROCHLORIDE 5 MG/1
5 TABLET ORAL ONCE
Refills: 0 | Status: DISCONTINUED | OUTPATIENT
Start: 2021-01-11 | End: 2021-01-12

## 2021-01-11 RX ORDER — AMPICILLIN TRIHYDRATE 250 MG
2 CAPSULE ORAL ONCE
Refills: 0 | Status: COMPLETED | OUTPATIENT
Start: 2021-01-11 | End: 2021-01-11

## 2021-01-11 RX ORDER — IBUPROFEN 200 MG
600 TABLET ORAL EVERY 6 HOURS
Refills: 0 | Status: COMPLETED | OUTPATIENT
Start: 2021-01-11 | End: 2021-12-10

## 2021-01-11 RX ORDER — AER TRAVELER 0.5 G/1
1 SOLUTION RECTAL; TOPICAL EVERY 4 HOURS
Refills: 0 | Status: DISCONTINUED | OUTPATIENT
Start: 2021-01-11 | End: 2021-01-12

## 2021-01-11 RX ORDER — HYDROCORTISONE 1 %
1 OINTMENT (GRAM) TOPICAL EVERY 6 HOURS
Refills: 0 | Status: DISCONTINUED | OUTPATIENT
Start: 2021-01-11 | End: 2021-01-12

## 2021-01-11 RX ORDER — IBUPROFEN 200 MG
600 TABLET ORAL EVERY 6 HOURS
Refills: 0 | Status: DISCONTINUED | OUTPATIENT
Start: 2021-01-11 | End: 2021-01-12

## 2021-01-11 RX ORDER — OXYCODONE HYDROCHLORIDE 5 MG/1
5 TABLET ORAL
Refills: 0 | Status: DISCONTINUED | OUTPATIENT
Start: 2021-01-11 | End: 2021-01-12

## 2021-01-11 RX ORDER — SODIUM CHLORIDE 9 MG/ML
1000 INJECTION, SOLUTION INTRAVENOUS
Refills: 0 | Status: DISCONTINUED | OUTPATIENT
Start: 2021-01-11 | End: 2021-01-12

## 2021-01-11 RX ORDER — LANOLIN
1 OINTMENT (GRAM) TOPICAL EVERY 6 HOURS
Refills: 0 | Status: DISCONTINUED | OUTPATIENT
Start: 2021-01-11 | End: 2021-01-12

## 2021-01-11 RX ORDER — SIMETHICONE 80 MG/1
80 TABLET, CHEWABLE ORAL EVERY 4 HOURS
Refills: 0 | Status: DISCONTINUED | OUTPATIENT
Start: 2021-01-11 | End: 2021-01-12

## 2021-01-11 RX ORDER — DIBUCAINE 1 %
1 OINTMENT (GRAM) RECTAL EVERY 6 HOURS
Refills: 0 | Status: DISCONTINUED | OUTPATIENT
Start: 2021-01-11 | End: 2021-01-12

## 2021-01-11 RX ORDER — TETANUS TOXOID, REDUCED DIPHTHERIA TOXOID AND ACELLULAR PERTUSSIS VACCINE, ADSORBED 5; 2.5; 8; 8; 2.5 [IU]/.5ML; [IU]/.5ML; UG/.5ML; UG/.5ML; UG/.5ML
0.5 SUSPENSION INTRAMUSCULAR ONCE
Refills: 0 | Status: DISCONTINUED | OUTPATIENT
Start: 2021-01-11 | End: 2021-01-12

## 2021-01-11 RX ORDER — KETOROLAC TROMETHAMINE 30 MG/ML
30 SYRINGE (ML) INJECTION ONCE
Refills: 0 | Status: DISCONTINUED | OUTPATIENT
Start: 2021-01-11 | End: 2021-01-11

## 2021-01-11 RX ORDER — DIPHENHYDRAMINE HCL 50 MG
25 CAPSULE ORAL EVERY 6 HOURS
Refills: 0 | Status: DISCONTINUED | OUTPATIENT
Start: 2021-01-11 | End: 2021-01-12

## 2021-01-11 RX ORDER — ACETAMINOPHEN 500 MG
975 TABLET ORAL
Refills: 0 | Status: DISCONTINUED | OUTPATIENT
Start: 2021-01-11 | End: 2021-01-12

## 2021-01-11 RX ORDER — MAGNESIUM HYDROXIDE 400 MG/1
30 TABLET, CHEWABLE ORAL
Refills: 0 | Status: DISCONTINUED | OUTPATIENT
Start: 2021-01-11 | End: 2021-01-12

## 2021-01-11 RX ORDER — PRAMOXINE HYDROCHLORIDE 150 MG/15G
1 AEROSOL, FOAM RECTAL EVERY 4 HOURS
Refills: 0 | Status: DISCONTINUED | OUTPATIENT
Start: 2021-01-11 | End: 2021-01-12

## 2021-01-11 RX ORDER — AMPICILLIN TRIHYDRATE 250 MG
1 CAPSULE ORAL EVERY 4 HOURS
Refills: 0 | Status: DISCONTINUED | OUTPATIENT
Start: 2021-01-11 | End: 2021-01-11

## 2021-01-11 RX ORDER — OXYTOCIN 10 UNIT/ML
333.33 VIAL (ML) INJECTION
Qty: 20 | Refills: 0 | Status: COMPLETED | OUTPATIENT
Start: 2021-01-11 | End: 2021-01-11

## 2021-01-11 RX ORDER — SODIUM CHLORIDE 9 MG/ML
3 INJECTION INTRAMUSCULAR; INTRAVENOUS; SUBCUTANEOUS EVERY 8 HOURS
Refills: 0 | Status: DISCONTINUED | OUTPATIENT
Start: 2021-01-11 | End: 2021-01-12

## 2021-01-11 RX ADMIN — Medication 975 MILLIGRAM(S): at 22:31

## 2021-01-11 RX ADMIN — Medication 1 TABLET(S): at 15:18

## 2021-01-11 RX ADMIN — SODIUM CHLORIDE 3 MILLILITER(S): 9 INJECTION INTRAMUSCULAR; INTRAVENOUS; SUBCUTANEOUS at 15:05

## 2021-01-11 RX ADMIN — Medication 30 MILLIGRAM(S): at 11:35

## 2021-01-11 RX ADMIN — SODIUM CHLORIDE 3 MILLILITER(S): 9 INJECTION INTRAMUSCULAR; INTRAVENOUS; SUBCUTANEOUS at 22:45

## 2021-01-11 RX ADMIN — Medication 216 GRAM(S): at 07:15

## 2021-01-11 RX ADMIN — SODIUM CHLORIDE 125 MILLILITER(S): 9 INJECTION, SOLUTION INTRAVENOUS at 07:43

## 2021-01-11 RX ADMIN — Medication 1000 MILLIUNIT(S)/MIN: at 10:35

## 2021-01-11 RX ADMIN — Medication 975 MILLIGRAM(S): at 14:07

## 2021-01-11 NOTE — OB PROVIDER H&P - HISTORY OF PRESENT ILLNESS
36 y/o pt 39.1 weeks  presents to triage with c/o painful contractions every 2-3 minutes. pt reports 8/10 pain with contraction. pt denies any LOF and bleeding. pt denies n/v/d, fever or chills. pt endorses +fetal movement.   AP complicated by:  -GDM A1  -Chronic Hepatitis B     NKDA  PMH:   Chronic Hepatitis B- dx in 2019 with last pregnancy  PSH:   Left salpingectomy 18'secondary to infection  OB:   2007 FT 7#   2019 GDM A1 7#1  SAB x2 1 complete, 1 D&C  GYN: denies  Social hx: denies  Medications: PNV

## 2021-01-11 NOTE — OB PROVIDER TRIAGE NOTE - NSHPPHYSICALEXAM_GEN_ALL_CORE
Vital Signs Last 24 Hrs  T(C): 36.7 (07 Jan 2021 04:20), Max: 36.7 (07 Jan 2021 04:20)  T(F): 98.1 (07 Jan 2021 04:20), Max: 98.1 (07 Jan 2021 04:20)  HR: 72 (07 Jan 2021 04:20) (72 - 72)  BP: 112/66 (07 Jan 2021 04:20) (112/66 - 112/66)  BP(mean): --  RR: 17 (07 Jan 2021 04:20) (17 - 17)  SpO2: --    General: A&O x3  Abdomen: soft, non tender. no guarding or rebound tenderness  SVE: 6/80/-3  TAS: vertex presentation   EFW 3402 gm by Leopold's     NST reactive with moderate variability, cat 1  tococtx [id number]

## 2021-01-11 NOTE — CHART NOTE - NSCHARTNOTEFT_GEN_A_CORE
Pt seen at bedside due to c/o increased pressure  SVE 10/100/0  EFM baseline 130 mod mikhail +accel no decel    Pt to be set up to push    TLal PGY4  D/w Dr Isabel

## 2021-01-11 NOTE — OB PROVIDER H&P - BIRTH SEX
When Outside In The Sun, Do You...: mostly burns, rarely tans Additional History: Patient was here today for a micro needling PRP procedureon the hands and pre-paid. We did not have anyone available on site to do blood draw at the time of patients appointment so the patient opted to come back tomorrow morning at 9 AM and was happy to do so as she wanted to do some gardening today since the sun was out. Female

## 2021-01-11 NOTE — DISCHARGE NOTE OB - PATIENT PORTAL LINK FT
You can access the FollowMyHealth Patient Portal offered by [facility] by registering at the following website: [email or web address]. By joining [employer]’s FollowMyHealth portal, you will also be able to view your health information using other applications (apps) compatible with our system.

## 2021-01-11 NOTE — OB RN PATIENT PROFILE - 'COMMUNITY AGENCIES/SUPPORT GROUPS, OB PROFILE
[facility] has declined referral. Message left with Michelle Cruz regarding Medicare Important message. IM emailed to Memo Teague [facility]) at [email or web address]. Medicare IM copy left in patients bedside. [facility] able to accept patient.  U N/A

## 2021-01-11 NOTE — DISCHARGE NOTE OB - CARE PLAN
Principal Discharge DX:	Normal vaginal delivery  Goal:	fully recovered  Assessment and plan of treatment:	dr gupta 6 weeks

## 2021-01-11 NOTE — OB PROVIDER H&P - NSHPPHYSICALEXAM_GEN_ALL_CORE
Vital Signs Last 24 Hrs  T(C): 36.7 (07 Jan 2021 04:20), Max: 36.7 (07 Jan 2021 04:20)  T(F): 98.1 (07 Jan 2021 04:20), Max: 98.1 (07 Jan 2021 04:20)  HR: 72 (07 Jan 2021 04:20) (72 - 72)  BP: 112/66 (07 Jan 2021 04:20) (112/66 - 112/66)  BP(mean): --  RR: 17 (07 Jan 2021 04:20) (17 - 17)  SpO2: --    General: A&O x3  Abdomen: soft, non tender. no guarding or rebound tenderness  SVE: 6/80/-3  TAS: vertex presentation   EFW 3402 gm by Leopold's     NST reactive with moderate variability, cat 1  tococtx [id number]

## 2021-01-11 NOTE — OB PROVIDER H&P - PROBLEM SELECTOR PLAN 1
-Admit l&d. Routine labs  -Expectant management of labor   -Fetus: cat 1 tracing, vertex presentation, continuous monitoring  -Pain: patient approved for epidural   -HepB + standard protocol   -Ampicillin for GBS Prophylaxis  -Covid 19 pending for patient and support person  -Consents signed and witnessed at bedside

## 2021-01-11 NOTE — DISCHARGE NOTE OB - MATERIALS PROVIDED
[facility] Screening Program/Guide to Postpartum Care/Shaken Baby Prevention Handout/Discharge Medication Information for Patients and Families Pocket Guide

## 2021-01-11 NOTE — DISCHARGE NOTE OB - CARE PROVIDER_API CALL
Nayely Isabel)  Obstetrics and Gynecology  [id number] [place] [address]  Phone: [phone]  Fax: [phone]  Established Patient  Follow Up Time:

## 2021-01-11 NOTE — OB RN DELIVERY SUMMARY - NS_SEPSISRSKCALC_OBGYN_ALL_OB_FT
EOS calculated successfully. EOS Risk Factor: 0.5/1000 live births ([facility] national incidence); GA=39w1d; Temp=98.4; ROM=1.333; GBS='Positive'; Antibiotics='GBS specific antibiotics > 2 hrs prior to birth'

## 2021-01-11 NOTE — CHART NOTE - NSCHARTNOTEFT_GEN_A_CORE
NP note    Pt seen for increased pressure  sp epidural    T(C): 36.9 (2021 06:34), Max: 36.9 (2021 05:52)  T(F): 98.4 (2021 06:34), Max: 98.4 (2021 05:52)  HR: 77 (2021 07:34) (75 - 105)  BP: 101/58 (2021 07:34) (99/63 - 125/80)  RR: 20 (2021 06:34) (16 - 20)  SpO2: 100% (2021 07:32) (98% - 100%)  EFM Cat I tracing  [place] q3-4 min  SVE 7/80/-1 intact    36 y/o  @39+1w in active labor cat I tracing    -Soto catheter to be placed  -Consider AROM next exam  -Dr. Isabel aware and in house    SABIHA mcgregor

## 2021-01-11 NOTE — OB PROVIDER TRIAGE NOTE - HISTORY OF PRESENT ILLNESS
34 y/o pt 39.1 weeks  presents to triage with c/o painful contractions every 2-3 minutes. pt reports 8/10 pain with contraction. pt denies any LOF and bleeding. pt denies n/v/d, fever or chills. pt endorses +fetal movement.   AP complicated by:  -GDM A1  -Chronic Hepatitis B     NKDA  PMH:   Chronic Hepatitis B- dx in 2019 with last pregnancy  PSH:   Left salpingectomy 18'secondary to infection  OB:   2007 FT 7#   2019 GDM A1 7#1  SAB x2 1 complete, 1 D&C  GYN: denies  Social hx: denies  Medications: PNV

## 2021-01-11 NOTE — OB PROVIDER DELIVERY SUMMARY - NSPROVIDERDELIVERYNOTE_OBGYN_ALL_OB_FT
Spontaneous vaginal delivery of liveborn infant from MELLY position. Head, shoulders, and body delivered easily. Infant was suctioned. No mec. Cord was clamped and cut and infant was passed to mother. Placenta delivered intact with a 3 vessel cord. Fundal massage was given and uterine fundus was found to be firm. Vaginal exam revealed an intact cervix, vaginal walls and sulci. Perineum intact. Excellent hemostasis was noted. Patient was stable. Count was correct x 2.    Zeyad Simmons MD PGY1

## 2021-01-11 NOTE — OB PROVIDER TRIAGE NOTE - NSOBPROVIDERNOTE_OBGYN_ALL_OB_FT
34 y/o pt  39.1 weeks presents in active labor     Plan:   d/w with Dr Isabel  Plan:   -Admit l&d. Routine labs  -Expectant management of labor   -Fetus: cat 1 tracing, vertex presentation, continuous monitoring  -Pain: patient approved for epidural   -HepB + standard protocol   -Ampicillin for GBS Prophylaxis  -Covid 19 pending for patient and support person  -Consents signed and witnessed at bedside

## 2021-01-12 VITALS
TEMPERATURE: 98 F | DIASTOLIC BLOOD PRESSURE: 82 MMHG | SYSTOLIC BLOOD PRESSURE: 107 MMHG | RESPIRATION RATE: 17 BRPM | OXYGEN SATURATION: 99 % | HEART RATE: 88 BPM

## 2021-01-12 RX ORDER — ACETAMINOPHEN 500 MG
3 TABLET ORAL
Qty: 0 | Refills: 0 | DISCHARGE
Start: 2021-01-12

## 2021-01-12 RX ORDER — IBUPROFEN 200 MG
1 TABLET ORAL
Qty: 0 | Refills: 0 | DISCHARGE
Start: 2021-01-12

## 2021-01-12 RX ADMIN — Medication 600 MILLIGRAM(S): at 01:31

## 2021-01-12 RX ADMIN — SODIUM CHLORIDE 3 MILLILITER(S): 9 INJECTION INTRAMUSCULAR; INTRAVENOUS; SUBCUTANEOUS at 06:33

## 2021-01-12 RX ADMIN — Medication 1 TABLET(S): at 12:38

## 2021-01-12 RX ADMIN — Medication 975 MILLIGRAM(S): at 04:54

## 2021-01-12 RX ADMIN — Medication 975 MILLIGRAM(S): at 12:39

## 2021-01-12 NOTE — PROGRESS NOTE ADULT - SUBJECTIVE AND OBJECTIVE BOX
Day  1  Vaginal Delivery    She feels well. Pain is well controlled. Minimal vaginal bleeding.  T(C): 36.7 (21 @ 05:03), Max: 36.7 (21 @ 16:00)  HR: 72 (21 @ 05:03) (68 - 90)  BP: 98/67 (21 @ 05:03) (98/67 - 121/85)  RR: 18 (21 @ 05:03) (18 - 18)  SpO2: 99% (21 @ 05:03) (98% - 100%)  Wt(kg): --  Vital signs stable    Exam   Abdomen - soft, nondistended, appropriately tender, fundus firm  Ext - no calf tenderness        Assessment and Plan  Postpartum day #1 s/p  - stable  Continue current pain medication  Encourage  Ambulation  Encourage regular diet  DVT ppx: SCDs only when not ambulating  She will be discharged home today according to current pandemic criteria

## 2021-01-22 ENCOUNTER — APPOINTMENT (OUTPATIENT)
Dept: OBGYN | Facility: CLINIC | Age: 36
End: 2021-01-22
Payer: COMMERCIAL

## 2021-01-22 PROCEDURE — 0503F POSTPARTUM CARE VISIT: CPT

## 2021-03-09 ENCOUNTER — APPOINTMENT (OUTPATIENT)
Dept: OBGYN | Facility: CLINIC | Age: 36
End: 2021-03-09
Payer: COMMERCIAL

## 2021-03-09 PROCEDURE — 0503F POSTPARTUM CARE VISIT: CPT

## 2021-05-03 NOTE — OB PROVIDER H&P - NS_EFW_OBGYN_ALL_OB_FT
Pharmacy requesting refill: Valacyclovir  Last OV: 10/7/20  Next OV: none  Last refill: 10/7/20  Refilled.    [id number]

## 2021-06-01 ENCOUNTER — APPOINTMENT (OUTPATIENT)
Dept: OBGYN | Facility: CLINIC | Age: 36
End: 2021-06-01
Payer: MEDICAID

## 2021-06-01 ENCOUNTER — NON-APPOINTMENT (OUTPATIENT)
Age: 36
End: 2021-06-01

## 2021-06-01 PROCEDURE — 0503F POSTPARTUM CARE VISIT: CPT

## 2021-09-01 NOTE — OB RN PATIENT PROFILE - EDUCATION ON THE POTENTIAL RISKS AND IMPACT OF EARLY USE OF PACIFIERS ON THE ESTABLISHMENT OF BREASTFEEDING
1130 Mom and father at bedside. Dicussed time for flag ceremony, and sibling arrival.     1300 Hand print completed. Ink pads provided for fingerprints.     1730 Plan for flag ceremony. Main entrance.        Statement Selected

## 2021-10-20 ENCOUNTER — APPOINTMENT (OUTPATIENT)
Dept: OBGYN | Facility: CLINIC | Age: 36
End: 2021-10-20

## 2022-03-29 ENCOUNTER — APPOINTMENT (OUTPATIENT)
Dept: OBGYN | Facility: CLINIC | Age: 37
End: 2022-03-29

## 2022-04-12 ENCOUNTER — APPOINTMENT (OUTPATIENT)
Dept: OBGYN | Facility: CLINIC | Age: 37
End: 2022-04-12
Payer: MEDICAID

## 2022-04-12 DIAGNOSIS — N97.1 FEMALE INFERTILITY OF TUBAL ORIGIN: ICD-10-CM

## 2022-04-12 DIAGNOSIS — R10.11 RIGHT UPPER QUADRANT PAIN: ICD-10-CM

## 2022-04-12 DIAGNOSIS — N97.9 FEMALE INFERTILITY, UNSPECIFIED: ICD-10-CM

## 2022-04-12 DIAGNOSIS — Z00.00 ENCOUNTER FOR GENERAL ADULT MEDICAL EXAMINATION W/OUT ABNORMAL FINDINGS: ICD-10-CM

## 2022-04-12 PROCEDURE — 99214 OFFICE O/P EST MOD 30 MIN: CPT | Mod: 25

## 2022-04-12 PROCEDURE — 99395 PREV VISIT EST AGE 18-39: CPT

## 2022-04-12 NOTE — PHYSICAL EXAM
[Chaperone Present] : A chaperone was present in the examining room during all aspects of the physical examination [FreeTextEntry1] : Марина [Appropriately responsive] : appropriately responsive [Alert] : alert [No Acute Distress] : no acute distress [No Lymphadenopathy] : no lymphadenopathy [Soft] : soft [Non-tender] : non-tender [Non-distended] : non-distended [No HSM] : No HSM [No Lesions] : no lesions [No Mass] : no mass [Oriented x3] : oriented x3 [Examination Of The Breasts] : a normal appearance [No Masses] : no breast masses were palpable [Labia Majora] : normal [Labia Minora] : normal [Normal] : normal [Uterine Adnexae] : normal

## 2022-04-12 NOTE — HISTORY OF PRESENT ILLNESS
[N] : Patient does not use contraception [Monogamous (Male Partner)] : is monogamous with a male partner [Y] : Positive pregnancy history [LMPDate] : 03/30/2022 [MensesFreq] : 30 [MensesLength] : 6 [PGxTotal] : 8 [[facility]] : 3 [PGHxPremature] : 0 [PGHxAbortions] : 5 [[facility]] : 3 [PGHxABSpont] : 5 [FreeTextEntry1] : NSVDx3 , MABx5, h/o of fallopian tube infection and blocked fallopian tubes  [Currently Active] : currently active [Men] : men [Vaginal] : vaginal [No] : No

## 2022-04-12 NOTE — PLAN
[FreeTextEntry1] : 36 year  y/o P3 presenting for annual exam\par -f/u pap and GC/CT\par -bilateral breast exam, educated on monthly SBE\par -Contraception: none\par - will be having egg retrieval at the end of this month, and IVF. \par -f/u PRN\par

## 2022-04-13 LAB — HPV HIGH+LOW RISK DNA PNL CVX: NOT DETECTED

## 2022-04-14 LAB
C TRACH RRNA SPEC QL NAA+PROBE: NOT DETECTED
N GONORRHOEA RRNA SPEC QL NAA+PROBE: NOT DETECTED
SOURCE AMPLIFICATION: NORMAL

## 2022-04-18 LAB
CYTOLOGY CVX/VAG DOC THIN PREP: NORMAL
HPV 16 E6+E7 MRNA CVX QL NAA+PROBE: NOT DETECTED
HPV18+45 E6+E7 MRNA CVX QL NAA+PROBE: NOT DETECTED

## 2022-08-09 ENCOUNTER — APPOINTMENT (OUTPATIENT)
Dept: SURGERY | Facility: CLINIC | Age: 37
End: 2022-08-09

## 2022-11-10 NOTE — OB PROVIDER DELIVERY SUMMARY - NSCOUNTCORRECT_OBGYN_ALL_OB
Infusion Nursing Note:  Diana SANTA Elizabethluis presents today for Keytruda.    Patient seen by provider today: No   present during visit today: Not Applicable.    Note: N/A.    Intravenous Access:  Implanted Port, Alteplase needed today for no blood return (see-MAR).    Treatment Conditions:  Lab Results   Component Value Date     11/10/2022    POTASSIUM 4.1 11/10/2022    CR 0.68 11/10/2022    ANURADHA 9.2 11/10/2022    BILITOTAL <0.2 11/10/2022    ALBUMIN 3.8 11/10/2022    ALT 10 11/10/2022    AST 16 11/10/2022     Results reviewed, labs MET treatment parameters, ok to proceed with treatment.    Post Infusion Assessment:  Patient tolerated infusion without incident.  Blood return noted pre and post infusion.  Site patent and intact, free from redness, edema or discomfort.  No evidence of extravasations.  Access discontinued per protocol.     Discharge Plan:   Patient discharged in stable condition accompanied by: self.  Departure Mode: Ambulatory.      Bella Nunez RN                      
Laps, needles and instruments count was reported as correct.

## 2022-11-17 ENCOUNTER — APPOINTMENT (OUTPATIENT)
Dept: OBGYN | Facility: CLINIC | Age: 37
End: 2022-11-17

## 2022-11-17 ENCOUNTER — APPOINTMENT (OUTPATIENT)
Dept: ANTEPARTUM | Facility: CLINIC | Age: 37
End: 2022-11-17

## 2022-11-17 VITALS
SYSTOLIC BLOOD PRESSURE: 114 MMHG | WEIGHT: 144 LBS | BODY MASS INDEX: 29.03 KG/M2 | HEIGHT: 59 IN | DIASTOLIC BLOOD PRESSURE: 74 MMHG

## 2022-11-17 PROCEDURE — 36415 COLL VENOUS BLD VENIPUNCTURE: CPT

## 2022-11-17 PROCEDURE — 0501F PRENATAL FLOW SHEET: CPT

## 2022-11-17 NOTE — HISTORY OF PRESENT ILLNESS
[FreeTextEntry1] : Patient is a 36 year old who presents after she had a positive home pregnancy test. LMP 9/1/22. She is endorsing occasional nausea and breast tenderness. Pt reports this is an IVF pregnancy. \par

## 2022-11-17 NOTE — PLAN
[FreeTextEntry1] : 36 year old presenting with amenorrhea \par -f/u PAP and GC/CT done today\par -f/u prenatal blood work drawn today\par -RTO 2 weeks for first trimester ultrasound and review of prenatal lab results

## 2022-11-18 LAB
ABO + RH PNL BLD: NORMAL
APPEARANCE: CLEAR
BACTERIA: NEGATIVE
BASOPHILS # BLD AUTO: 0.03 K/UL
BASOPHILS NFR BLD AUTO: 0.4 %
BILIRUBIN URINE: NEGATIVE
BLD GP AB SCN SERPL QL: NORMAL
BLOOD URINE: NEGATIVE
COLOR: NORMAL
EOSINOPHIL # BLD AUTO: 0.08 K/UL
EOSINOPHIL NFR BLD AUTO: 1 %
ESTIMATED AVERAGE GLUCOSE: 97 MG/DL
GLUCOSE QUALITATIVE U: NEGATIVE
GLUCOSE SERPL-MCNC: 120 MG/DL
HBA1C MFR BLD HPLC: 5 %
HCT VFR BLD CALC: 33.5 %
HGB A MFR BLD: 97.3 %
HGB A2 MFR BLD: 2.7 %
HGB BLD-MCNC: 11 G/DL
HGB FRACT BLD-IMP: NORMAL
HIV1+2 AB SPEC QL IA.RAPID: NONREACTIVE
HYALINE CASTS: 0 /LPF
IMM GRANULOCYTES NFR BLD AUTO: 0.8 %
KETONES URINE: NEGATIVE
LEUKOCYTE ESTERASE URINE: NEGATIVE
LYMPHOCYTES # BLD AUTO: 2.08 K/UL
LYMPHOCYTES NFR BLD AUTO: 26.7 %
MAN DIFF?: NORMAL
MCHC RBC-ENTMCNC: 30.9 PG
MCHC RBC-ENTMCNC: 32.8 GM/DL
MCV RBC AUTO: 94.1 FL
MICROSCOPIC-UA: NORMAL
MONOCYTES # BLD AUTO: 0.46 K/UL
MONOCYTES NFR BLD AUTO: 5.9 %
NEUTROPHILS # BLD AUTO: 5.07 K/UL
NEUTROPHILS NFR BLD AUTO: 65.2 %
NITRITE URINE: NEGATIVE
PH URINE: 6
PLATELET # BLD AUTO: 392 K/UL
PROTEIN URINE: NEGATIVE
RBC # BLD: 3.56 M/UL
RBC # FLD: 12.5 %
RED BLOOD CELLS URINE: 3 /HPF
SPECIFIC GRAVITY URINE: 1.02
SQUAMOUS EPITHELIAL CELLS: 0 /HPF
UROBILINOGEN URINE: NORMAL
WBC # FLD AUTO: 7.78 K/UL
WHITE BLOOD CELLS URINE: 1 /HPF

## 2022-11-21 ENCOUNTER — NON-APPOINTMENT (OUTPATIENT)
Age: 37
End: 2022-11-21

## 2022-11-21 LAB
BACTERIA UR CULT: NORMAL
C TRACH RRNA SPEC QL NAA+PROBE: NOT DETECTED
LEAD BLD-MCNC: <1 UG/DL
M TB IFN-G BLD-IMP: NEGATIVE
MEV IGG FLD QL IA: 76 AU/ML
MEV IGG+IGM SER-IMP: POSITIVE
MUV AB SER-ACNC: POSITIVE
MUV IGG SER QL IA: >300 AU/ML
N GONORRHOEA RRNA SPEC QL NAA+PROBE: NOT DETECTED
QUANTIFERON TB PLUS MITOGEN MINUS NIL: 9.86 IU/ML
QUANTIFERON TB PLUS NIL: 0.06 IU/ML
QUANTIFERON TB PLUS TB1 MINUS NIL: -0.03 IU/ML
QUANTIFERON TB PLUS TB2 MINUS NIL: -0.03 IU/ML
RUBV IGG FLD-ACNC: 3.4 INDEX
RUBV IGG SER-IMP: POSITIVE
SOURCE AMPLIFICATION: NORMAL
T PALLIDUM AB SER QL IA: NEGATIVE

## 2022-11-22 ENCOUNTER — NON-APPOINTMENT (OUTPATIENT)
Age: 37
End: 2022-11-22

## 2022-11-22 LAB
AR GENE MUT ANL BLD/T: NORMAL
FMR1 GENE MUT ANL BLD/T: NORMAL

## 2022-11-28 ENCOUNTER — APPOINTMENT (OUTPATIENT)
Dept: OBGYN | Facility: CLINIC | Age: 37
End: 2022-11-28

## 2022-11-28 ENCOUNTER — NON-APPOINTMENT (OUTPATIENT)
Age: 37
End: 2022-11-28

## 2022-11-28 ENCOUNTER — APPOINTMENT (OUTPATIENT)
Dept: ANTEPARTUM | Facility: CLINIC | Age: 37
End: 2022-11-28

## 2022-11-28 LAB
HAV IGM SER QL: NONREACTIVE
HBV CORE IGM SER QL: NONREACTIVE
HBV SURFACE AG SER QL: REACTIVE
HCV AB SER QL: NONREACTIVE
HCV S/CO RATIO: 0.15 S/CO

## 2022-11-28 PROCEDURE — 76801 OB US < 14 WKS SINGLE FETUS: CPT

## 2022-11-28 PROCEDURE — 0502F SUBSEQUENT PRENATAL CARE: CPT

## 2022-11-28 PROCEDURE — 76813 OB US NUCHAL MEAS 1 GEST: CPT

## 2022-11-28 PROCEDURE — 99213 OFFICE O/P EST LOW 20 MIN: CPT

## 2022-12-01 ENCOUNTER — NON-APPOINTMENT (OUTPATIENT)
Age: 37
End: 2022-12-01

## 2022-12-01 LAB — CFTR MUT TESTED BLD/T: NEGATIVE

## 2022-12-28 ENCOUNTER — APPOINTMENT (OUTPATIENT)
Dept: OBGYN | Facility: CLINIC | Age: 37
End: 2022-12-28

## 2022-12-28 VITALS — WEIGHT: 147 LBS | SYSTOLIC BLOOD PRESSURE: 106 MMHG | DIASTOLIC BLOOD PRESSURE: 69 MMHG | BODY MASS INDEX: 29.69 KG/M2

## 2022-12-28 PROCEDURE — 0502F SUBSEQUENT PRENATAL CARE: CPT

## 2023-01-04 LAB
AFP MOM: 0.79
AFP VALUE: 31.8 NG/ML
ALPHA FETOPROTEIN SERUM COMMENT: NORMAL
ALPHA FETOPROTEIN SERUM INTERPRETATION: NORMAL
ALPHA FETOPROTEIN SERUM RESULTS: NORMAL
ALPHA FETOPROTEIN SERUM TEST RESULTS: NORMAL
GESTATIONAL AGE BASED ON: NORMAL
GESTATIONAL AGE ON COLLECTION DATE: 17 WEEKS
INSULIN DEP DIABETES: NO
MATERNAL AGE AT EDD AFP: 35.5 YR
MULTIPLE GESTATION: NO
OSBR RISK 1 IN: NORMAL
RACE: NORMAL
WEIGHT AFP: 147 LBS

## 2023-01-05 LAB
ALL CHROMOSOMES INTERPRETATION: NORMAL
ALL CHROMOSOMES TEST RESULT: NORMAL
CHROMOSOME13 INTERPRETATION: NORMAL
CHROMOSOME13 TEST RESULT: NORMAL
CHROMOSOME18 INTERPRETATION: NORMAL
CHROMOSOME18 TEST RESULT: NORMAL
CHROMOSOME21 INTERPRETATION: NORMAL
CHROMOSOME21 TEST RESULT: NORMAL
FETAL FRACTION: NORMAL
MICRODELETIONS INTERPRETATION: NORMAL
MICRODELETIONS RESULT: NORMAL
PERFORMANCE AND LIMITATIONS: NORMAL
SEX CHROMOSOME INTERPRETATION: NORMAL
SEX CHROMOSOME TEST RESULT: NORMAL
VERIFI PRENATAL TEST: NOT DETECTED

## 2023-01-25 ENCOUNTER — APPOINTMENT (OUTPATIENT)
Dept: ANTEPARTUM | Facility: CLINIC | Age: 38
End: 2023-01-25
Payer: MEDICAID

## 2023-01-25 ENCOUNTER — APPOINTMENT (OUTPATIENT)
Dept: OBGYN | Facility: CLINIC | Age: 38
End: 2023-01-25
Payer: MEDICAID

## 2023-01-25 VITALS
BODY MASS INDEX: 30.24 KG/M2 | HEIGHT: 59 IN | SYSTOLIC BLOOD PRESSURE: 107 MMHG | DIASTOLIC BLOOD PRESSURE: 73 MMHG | WEIGHT: 150 LBS

## 2023-01-25 PROCEDURE — 76811 OB US DETAILED SNGL FETUS: CPT

## 2023-01-25 PROCEDURE — 0502F SUBSEQUENT PRENATAL CARE: CPT

## 2023-02-22 ENCOUNTER — APPOINTMENT (OUTPATIENT)
Dept: OBGYN | Facility: CLINIC | Age: 38
End: 2023-02-22
Payer: MEDICAID

## 2023-02-22 VITALS
HEIGHT: 59 IN | HEART RATE: 105 BPM | BODY MASS INDEX: 30.84 KG/M2 | WEIGHT: 153 LBS | DIASTOLIC BLOOD PRESSURE: 73 MMHG | SYSTOLIC BLOOD PRESSURE: 111 MMHG

## 2023-02-22 PROCEDURE — 0502F SUBSEQUENT PRENATAL CARE: CPT

## 2023-02-24 LAB
BASOPHILS # BLD AUTO: 0.02 K/UL
BASOPHILS NFR BLD AUTO: 0.2 %
EOSINOPHIL # BLD AUTO: 0.09 K/UL
EOSINOPHIL NFR BLD AUTO: 0.9 %
GLUCOSE 1H P 50 G GLC PO SERPL-MCNC: 167 MG/DL
HCT VFR BLD CALC: 34.5 %
HGB BLD-MCNC: 10.9 G/DL
IMM GRANULOCYTES NFR BLD AUTO: 1.9 %
LYMPHOCYTES # BLD AUTO: 2.05 K/UL
LYMPHOCYTES NFR BLD AUTO: 21.2 %
MAN DIFF?: NORMAL
MCHC RBC-ENTMCNC: 31.1 PG
MCHC RBC-ENTMCNC: 31.6 GM/DL
MCV RBC AUTO: 98.6 FL
MONOCYTES # BLD AUTO: 0.61 K/UL
MONOCYTES NFR BLD AUTO: 6.3 %
NEUTROPHILS # BLD AUTO: 6.7 K/UL
NEUTROPHILS NFR BLD AUTO: 69.5 %
PLATELET # BLD AUTO: 303 K/UL
RBC # BLD: 3.5 M/UL
RBC # FLD: 14.1 %
WBC # FLD AUTO: 9.65 K/UL

## 2023-02-28 ENCOUNTER — OUTPATIENT (OUTPATIENT)
Dept: OUTPATIENT SERVICES | Age: 38
LOS: 1 days | Discharge: HOME | End: 2023-02-28

## 2023-02-28 DIAGNOSIS — Z90.79 ACQUIRED ABSENCE OF OTHER GENITAL ORGAN(S): Chronic | ICD-10-CM

## 2023-02-28 DIAGNOSIS — Z98.890 OTHER SPECIFIED POSTPROCEDURAL STATES: Chronic | ICD-10-CM

## 2023-02-28 DIAGNOSIS — N97.9 FEMALE INFERTILITY, UNSPECIFIED: Chronic | ICD-10-CM

## 2023-03-03 ENCOUNTER — APPOINTMENT (OUTPATIENT)
Dept: PEDIATRIC CARDIOLOGY | Facility: CLINIC | Age: 38
End: 2023-03-03
Payer: MEDICAID

## 2023-03-03 PROCEDURE — 76821 MIDDLE CEREBRAL ARTERY ECHO: CPT

## 2023-03-03 PROCEDURE — 76827 ECHO EXAM OF FETAL HEART: CPT

## 2023-03-03 PROCEDURE — 76825 ECHO EXAM OF FETAL HEART: CPT

## 2023-03-03 PROCEDURE — 93325 DOPPLER ECHO COLOR FLOW MAPG: CPT | Mod: 59

## 2023-03-03 PROCEDURE — 99202 OFFICE O/P NEW SF 15 MIN: CPT | Mod: 25

## 2023-03-03 PROCEDURE — 76820 UMBILICAL ARTERY ECHO: CPT

## 2023-03-22 ENCOUNTER — APPOINTMENT (OUTPATIENT)
Dept: OBGYN | Facility: CLINIC | Age: 38
End: 2023-03-22
Payer: MEDICAID

## 2023-03-22 ENCOUNTER — APPOINTMENT (OUTPATIENT)
Dept: ANTEPARTUM | Facility: CLINIC | Age: 38
End: 2023-03-22
Payer: MEDICAID

## 2023-03-22 VITALS — DIASTOLIC BLOOD PRESSURE: 71 MMHG | BODY MASS INDEX: 30.9 KG/M2 | WEIGHT: 153 LBS | SYSTOLIC BLOOD PRESSURE: 110 MMHG

## 2023-03-22 PROCEDURE — 76819 FETAL BIOPHYS PROFIL W/O NST: CPT | Mod: 59

## 2023-03-22 PROCEDURE — 76816 OB US FOLLOW-UP PER FETUS: CPT

## 2023-03-22 PROCEDURE — 0502F SUBSEQUENT PRENATAL CARE: CPT

## 2023-04-12 ENCOUNTER — APPOINTMENT (OUTPATIENT)
Dept: OBGYN | Facility: CLINIC | Age: 38
End: 2023-04-12
Payer: MEDICAID

## 2023-04-12 VITALS
WEIGHT: 154 LBS | BODY MASS INDEX: 31.04 KG/M2 | DIASTOLIC BLOOD PRESSURE: 72 MMHG | SYSTOLIC BLOOD PRESSURE: 113 MMHG | HEIGHT: 59 IN

## 2023-04-12 PROCEDURE — 0502F SUBSEQUENT PRENATAL CARE: CPT

## 2023-04-26 ENCOUNTER — APPOINTMENT (OUTPATIENT)
Dept: OBGYN | Facility: CLINIC | Age: 38
End: 2023-04-26
Payer: MEDICAID

## 2023-04-26 ENCOUNTER — APPOINTMENT (OUTPATIENT)
Dept: ANTEPARTUM | Facility: CLINIC | Age: 38
End: 2023-04-26
Payer: MEDICAID

## 2023-04-26 VITALS — WEIGHT: 156 LBS | DIASTOLIC BLOOD PRESSURE: 72 MMHG | BODY MASS INDEX: 31.51 KG/M2 | SYSTOLIC BLOOD PRESSURE: 122 MMHG

## 2023-04-26 PROCEDURE — 76819 FETAL BIOPHYS PROFIL W/O NST: CPT | Mod: 59

## 2023-04-26 PROCEDURE — 0502F SUBSEQUENT PRENATAL CARE: CPT

## 2023-04-26 PROCEDURE — 76816 OB US FOLLOW-UP PER FETUS: CPT

## 2023-04-27 LAB
T4 FREE SERPL-MCNC: 0.8 NG/DL
TSH SERPL-ACNC: 0.37 UIU/ML

## 2023-05-10 ENCOUNTER — APPOINTMENT (OUTPATIENT)
Dept: OBGYN | Facility: CLINIC | Age: 38
End: 2023-05-10
Payer: MEDICAID

## 2023-05-10 VITALS — SYSTOLIC BLOOD PRESSURE: 128 MMHG | BODY MASS INDEX: 31.91 KG/M2 | WEIGHT: 158 LBS | DIASTOLIC BLOOD PRESSURE: 80 MMHG

## 2023-05-10 PROCEDURE — 0502F SUBSEQUENT PRENATAL CARE: CPT

## 2023-05-11 LAB
BASOPHILS # BLD AUTO: 0.02 K/UL
BASOPHILS NFR BLD AUTO: 0.2 %
EOSINOPHIL # BLD AUTO: 0.11 K/UL
EOSINOPHIL NFR BLD AUTO: 1.4 %
HCT VFR BLD CALC: 34.9 %
HGB BLD-MCNC: 11.4 G/DL
HIV1+2 AB SPEC QL IA.RAPID: NONREACTIVE
IMM GRANULOCYTES NFR BLD AUTO: 2.7 %
LYMPHOCYTES # BLD AUTO: 2.31 K/UL
LYMPHOCYTES NFR BLD AUTO: 28.5 %
MAN DIFF?: NORMAL
MCHC RBC-ENTMCNC: 32.7 GM/DL
MCHC RBC-ENTMCNC: 33.5 PG
MCV RBC AUTO: 102.6 FL
MONOCYTES # BLD AUTO: 0.77 K/UL
MONOCYTES NFR BLD AUTO: 9.5 %
NEUTROPHILS # BLD AUTO: 4.67 K/UL
NEUTROPHILS NFR BLD AUTO: 57.7 %
PLATELET # BLD AUTO: 279 K/UL
RBC # BLD: 3.4 M/UL
RBC # FLD: 13.3 %
WBC # FLD AUTO: 8.1 K/UL

## 2023-05-15 LAB — B-HEM STREP SPEC QL CULT: NORMAL

## 2023-05-17 ENCOUNTER — APPOINTMENT (OUTPATIENT)
Dept: OBGYN | Facility: CLINIC | Age: 38
End: 2023-05-17
Payer: MEDICAID

## 2023-05-17 VITALS — SYSTOLIC BLOOD PRESSURE: 107 MMHG | BODY MASS INDEX: 31.1 KG/M2 | WEIGHT: 154 LBS | DIASTOLIC BLOOD PRESSURE: 68 MMHG

## 2023-05-17 PROCEDURE — 0502F SUBSEQUENT PRENATAL CARE: CPT

## 2023-05-24 ENCOUNTER — APPOINTMENT (OUTPATIENT)
Dept: ANTEPARTUM | Facility: CLINIC | Age: 38
End: 2023-05-24
Payer: MEDICAID

## 2023-05-24 ENCOUNTER — APPOINTMENT (OUTPATIENT)
Dept: OBGYN | Facility: CLINIC | Age: 38
End: 2023-05-24
Payer: MEDICAID

## 2023-05-24 VITALS — SYSTOLIC BLOOD PRESSURE: 125 MMHG | WEIGHT: 156 LBS | BODY MASS INDEX: 31.51 KG/M2 | DIASTOLIC BLOOD PRESSURE: 74 MMHG

## 2023-05-24 PROCEDURE — 0502F SUBSEQUENT PRENATAL CARE: CPT

## 2023-05-24 PROCEDURE — 76816 OB US FOLLOW-UP PER FETUS: CPT

## 2023-05-24 PROCEDURE — 76819 FETAL BIOPHYS PROFIL W/O NST: CPT | Mod: 59

## 2023-05-31 ENCOUNTER — TRANSCRIPTION ENCOUNTER (OUTPATIENT)
Age: 38
End: 2023-05-31

## 2023-05-31 ENCOUNTER — INPATIENT (INPATIENT)
Facility: HOSPITAL | Age: 38
LOS: 1 days | Discharge: HOME | End: 2023-06-02
Attending: OBSTETRICS & GYNECOLOGY | Admitting: OBSTETRICS & GYNECOLOGY
Payer: MEDICAID

## 2023-05-31 ENCOUNTER — APPOINTMENT (OUTPATIENT)
Dept: OBGYN | Facility: CLINIC | Age: 38
End: 2023-05-31
Payer: MEDICAID

## 2023-05-31 VITALS — BODY MASS INDEX: 31.1 KG/M2 | SYSTOLIC BLOOD PRESSURE: 113 MMHG | WEIGHT: 154 LBS | DIASTOLIC BLOOD PRESSURE: 75 MMHG

## 2023-05-31 VITALS
SYSTOLIC BLOOD PRESSURE: 115 MMHG | RESPIRATION RATE: 16 BRPM | HEART RATE: 94 BPM | TEMPERATURE: 98 F | DIASTOLIC BLOOD PRESSURE: 69 MMHG

## 2023-05-31 DIAGNOSIS — Z98.890 OTHER SPECIFIED POSTPROCEDURAL STATES: Chronic | ICD-10-CM

## 2023-05-31 DIAGNOSIS — O26.899 OTHER SPECIFIED PREGNANCY RELATED CONDITIONS, UNSPECIFIED TRIMESTER: ICD-10-CM

## 2023-05-31 DIAGNOSIS — Z90.79 ACQUIRED ABSENCE OF OTHER GENITAL ORGAN(S): Chronic | ICD-10-CM

## 2023-05-31 DIAGNOSIS — N97.9 FEMALE INFERTILITY, UNSPECIFIED: Chronic | ICD-10-CM

## 2023-05-31 LAB
BASOPHILS # BLD AUTO: 0.03 K/UL — SIGNIFICANT CHANGE UP (ref 0–0.2)
BASOPHILS NFR BLD AUTO: 0.3 % — SIGNIFICANT CHANGE UP (ref 0–2)
BLD GP AB SCN SERPL QL: NEGATIVE — SIGNIFICANT CHANGE UP
COVID-19 SPIKE DOMAIN AB INTERP: POSITIVE
COVID-19 SPIKE DOMAIN ANTIBODY RESULT: >250 U/ML — HIGH
EOSINOPHIL # BLD AUTO: 0.06 K/UL — SIGNIFICANT CHANGE UP (ref 0–0.5)
EOSINOPHIL NFR BLD AUTO: 0.7 % — SIGNIFICANT CHANGE UP (ref 0–6)
GLUCOSE BLDC GLUCOMTR-MCNC: 74 MG/DL — SIGNIFICANT CHANGE UP (ref 70–99)
GLUCOSE BLDC GLUCOMTR-MCNC: 90 MG/DL — SIGNIFICANT CHANGE UP (ref 70–99)
GLUCOSE BLDC GLUCOMTR-MCNC: 93 MG/DL — SIGNIFICANT CHANGE UP (ref 70–99)
HCT VFR BLD CALC: 35.8 % — SIGNIFICANT CHANGE UP (ref 34.5–45)
HGB BLD-MCNC: 12.1 G/DL — SIGNIFICANT CHANGE UP (ref 11.5–15.5)
IANC: 6 K/UL — SIGNIFICANT CHANGE UP (ref 1.8–7.4)
IMM GRANULOCYTES NFR BLD AUTO: 1.6 % — HIGH (ref 0–0.9)
LYMPHOCYTES # BLD AUTO: 1.88 K/UL — SIGNIFICANT CHANGE UP (ref 1–3.3)
LYMPHOCYTES # BLD AUTO: 21.5 % — SIGNIFICANT CHANGE UP (ref 13–44)
MCHC RBC-ENTMCNC: 32.8 PG — SIGNIFICANT CHANGE UP (ref 27–34)
MCHC RBC-ENTMCNC: 33.8 GM/DL — SIGNIFICANT CHANGE UP (ref 32–36)
MCV RBC AUTO: 97 FL — SIGNIFICANT CHANGE UP (ref 80–100)
MONOCYTES # BLD AUTO: 0.63 K/UL — SIGNIFICANT CHANGE UP (ref 0–0.9)
MONOCYTES NFR BLD AUTO: 7.2 % — SIGNIFICANT CHANGE UP (ref 2–14)
NEUTROPHILS # BLD AUTO: 6 K/UL — SIGNIFICANT CHANGE UP (ref 1.8–7.4)
NEUTROPHILS NFR BLD AUTO: 68.7 % — SIGNIFICANT CHANGE UP (ref 43–77)
NRBC # BLD: 0 /100 WBCS — SIGNIFICANT CHANGE UP (ref 0–0)
NRBC # FLD: 0 K/UL — SIGNIFICANT CHANGE UP (ref 0–0)
PLATELET # BLD AUTO: 284 K/UL — SIGNIFICANT CHANGE UP (ref 150–400)
RBC # BLD: 3.69 M/UL — LOW (ref 3.8–5.2)
RBC # FLD: 12.8 % — SIGNIFICANT CHANGE UP (ref 10.3–14.5)
RH IG SCN BLD-IMP: POSITIVE — SIGNIFICANT CHANGE UP
RH IG SCN BLD-IMP: POSITIVE — SIGNIFICANT CHANGE UP
SARS-COV-2 IGG+IGM SERPL QL IA: >250 U/ML — HIGH
SARS-COV-2 IGG+IGM SERPL QL IA: POSITIVE
WBC # BLD: 8.74 K/UL — SIGNIFICANT CHANGE UP (ref 3.8–10.5)
WBC # FLD AUTO: 8.74 K/UL — SIGNIFICANT CHANGE UP (ref 3.8–10.5)

## 2023-05-31 PROCEDURE — 59400 OBSTETRICAL CARE: CPT | Mod: U9,GC

## 2023-05-31 PROCEDURE — 0502F SUBSEQUENT PRENATAL CARE: CPT

## 2023-05-31 PROCEDURE — 0501F PRENATAL FLOW SHEET: CPT

## 2023-05-31 RX ORDER — SODIUM CHLORIDE 9 MG/ML
1000 INJECTION, SOLUTION INTRAVENOUS
Refills: 0 | Status: DISCONTINUED | OUTPATIENT
Start: 2023-05-31 | End: 2023-05-31

## 2023-05-31 RX ORDER — TETANUS TOXOID, REDUCED DIPHTHERIA TOXOID AND ACELLULAR PERTUSSIS VACCINE, ADSORBED 5; 2.5; 8; 8; 2.5 [IU]/.5ML; [IU]/.5ML; UG/.5ML; UG/.5ML; UG/.5ML
0.5 SUSPENSION INTRAMUSCULAR ONCE
Refills: 0 | Status: DISCONTINUED | OUTPATIENT
Start: 2023-05-31 | End: 2023-06-02

## 2023-05-31 RX ORDER — KETOROLAC TROMETHAMINE 30 MG/ML
30 SYRINGE (ML) INJECTION ONCE
Refills: 0 | Status: DISCONTINUED | OUTPATIENT
Start: 2023-05-31 | End: 2023-05-31

## 2023-05-31 RX ORDER — OXYCODONE HYDROCHLORIDE 5 MG/1
5 TABLET ORAL ONCE
Refills: 0 | Status: DISCONTINUED | OUTPATIENT
Start: 2023-05-31 | End: 2023-06-02

## 2023-05-31 RX ORDER — SIMETHICONE 80 MG/1
80 TABLET, CHEWABLE ORAL EVERY 4 HOURS
Refills: 0 | Status: DISCONTINUED | OUTPATIENT
Start: 2023-05-31 | End: 2023-06-02

## 2023-05-31 RX ORDER — SODIUM CHLORIDE 9 MG/ML
3 INJECTION INTRAMUSCULAR; INTRAVENOUS; SUBCUTANEOUS EVERY 8 HOURS
Refills: 0 | Status: DISCONTINUED | OUTPATIENT
Start: 2023-05-31 | End: 2023-06-02

## 2023-05-31 RX ORDER — LANOLIN
1 OINTMENT (GRAM) TOPICAL EVERY 6 HOURS
Refills: 0 | Status: DISCONTINUED | OUTPATIENT
Start: 2023-05-31 | End: 2023-06-02

## 2023-05-31 RX ORDER — IBUPROFEN 200 MG
600 TABLET ORAL EVERY 6 HOURS
Refills: 0 | Status: DISCONTINUED | OUTPATIENT
Start: 2023-05-31 | End: 2023-06-02

## 2023-05-31 RX ORDER — OXYCODONE HYDROCHLORIDE 5 MG/1
5 TABLET ORAL
Refills: 0 | Status: DISCONTINUED | OUTPATIENT
Start: 2023-05-31 | End: 2023-06-02

## 2023-05-31 RX ORDER — CHLORHEXIDINE GLUCONATE 213 G/1000ML
1 SOLUTION TOPICAL DAILY
Refills: 0 | Status: DISCONTINUED | OUTPATIENT
Start: 2023-05-31 | End: 2023-05-31

## 2023-05-31 RX ORDER — HYDROCORTISONE 1 %
1 OINTMENT (GRAM) TOPICAL EVERY 6 HOURS
Refills: 0 | Status: DISCONTINUED | OUTPATIENT
Start: 2023-05-31 | End: 2023-06-02

## 2023-05-31 RX ORDER — IBUPROFEN 200 MG
600 TABLET ORAL EVERY 6 HOURS
Refills: 0 | Status: COMPLETED | OUTPATIENT
Start: 2023-05-31 | End: 2024-04-28

## 2023-05-31 RX ORDER — CITRIC ACID/SODIUM CITRATE 300-500 MG
15 SOLUTION, ORAL ORAL EVERY 6 HOURS
Refills: 0 | Status: DISCONTINUED | OUTPATIENT
Start: 2023-05-31 | End: 2023-05-31

## 2023-05-31 RX ORDER — DIBUCAINE 1 %
1 OINTMENT (GRAM) RECTAL EVERY 6 HOURS
Refills: 0 | Status: DISCONTINUED | OUTPATIENT
Start: 2023-05-31 | End: 2023-06-02

## 2023-05-31 RX ORDER — DIPHENHYDRAMINE HCL 50 MG
25 CAPSULE ORAL EVERY 6 HOURS
Refills: 0 | Status: DISCONTINUED | OUTPATIENT
Start: 2023-05-31 | End: 2023-06-02

## 2023-05-31 RX ORDER — OXYTOCIN 10 UNIT/ML
333.33 VIAL (ML) INJECTION
Qty: 20 | Refills: 0 | Status: DISCONTINUED | OUTPATIENT
Start: 2023-05-31 | End: 2023-05-31

## 2023-05-31 RX ORDER — MAGNESIUM HYDROXIDE 400 MG/1
30 TABLET, CHEWABLE ORAL
Refills: 0 | Status: DISCONTINUED | OUTPATIENT
Start: 2023-05-31 | End: 2023-06-02

## 2023-05-31 RX ORDER — OXYTOCIN 10 UNIT/ML
2 VIAL (ML) INJECTION
Qty: 30 | Refills: 0 | Status: DISCONTINUED | OUTPATIENT
Start: 2023-05-31 | End: 2023-05-31

## 2023-05-31 RX ORDER — PRAMOXINE HYDROCHLORIDE 150 MG/15G
1 AEROSOL, FOAM RECTAL EVERY 4 HOURS
Refills: 0 | Status: DISCONTINUED | OUTPATIENT
Start: 2023-05-31 | End: 2023-06-02

## 2023-05-31 RX ORDER — ACETAMINOPHEN 500 MG
975 TABLET ORAL
Refills: 0 | Status: DISCONTINUED | OUTPATIENT
Start: 2023-05-31 | End: 2023-06-02

## 2023-05-31 RX ORDER — AER TRAVELER 0.5 G/1
1 SOLUTION RECTAL; TOPICAL EVERY 4 HOURS
Refills: 0 | Status: DISCONTINUED | OUTPATIENT
Start: 2023-05-31 | End: 2023-06-02

## 2023-05-31 RX ORDER — BENZOCAINE 10 %
1 GEL (GRAM) MUCOUS MEMBRANE EVERY 6 HOURS
Refills: 0 | Status: DISCONTINUED | OUTPATIENT
Start: 2023-05-31 | End: 2023-06-02

## 2023-05-31 RX ORDER — OXYTOCIN 10 UNIT/ML
41.67 VIAL (ML) INJECTION
Qty: 20 | Refills: 0 | Status: DISCONTINUED | OUTPATIENT
Start: 2023-05-31 | End: 2023-05-31

## 2023-05-31 RX ADMIN — SODIUM CHLORIDE 3 MILLILITER(S): 9 INJECTION INTRAMUSCULAR; INTRAVENOUS; SUBCUTANEOUS at 20:00

## 2023-05-31 RX ADMIN — Medication 2 MILLIUNIT(S)/MIN: at 18:14

## 2023-05-31 RX ADMIN — SODIUM CHLORIDE 125 MILLILITER(S): 9 INJECTION, SOLUTION INTRAVENOUS at 16:45

## 2023-05-31 RX ADMIN — Medication 975 MILLIGRAM(S): at 23:48

## 2023-05-31 NOTE — OB PROVIDER LABOR PROGRESS NOTE - ASSESSMENT
A/P 37y  @39w in labor c/b GDMA1  -s/p AROM at 730p scant blood-tinged fluid, c/w pitocin  -Cat 1 tracing  -GBS neg  -EFW 3629  -GDMA1 - c/w rotating fluids, FS per protocol  -Analgesia epi  -Anticipate     Plan per Dr. Marline White, PGY-1

## 2023-05-31 NOTE — DISCHARGE NOTE OB - NS AS DC FU INST LIST INST
Subjective:     Kristina Davis is a 56 y.o. female who presents for Employment Physical ([facility] Employment Physical/)      HPI    Past Medical History:   Diagnosis Date    Backache S/P L4-5    decompressive laminectomies with bilateral foramionotomies    Bipolar I disorder, most recent episode (or current) unspecified     untreated      Cough     Displacement of lumbar intervertebral disc without myelopathy     right sided sciatica    Dizziness and giddiness     Irregular menstrual cycle     menorrhagia lifelong    Lumbar pain  MRI   9/2008    MRI (9/2008) Left lateral disk protusion L5-S1 with left S1 root compression,small central disk  protusion at the L4-L5 with effacement of the ventral sac and small radial tear of the annulus as well as mild right lateral disk bulge at L4-L5 with neuro foraminal stenosis.    Need for Td vaccine  needed    Osteoarthrosis involving, or with mention of more than one site, but not specified as generalized, multiple sites     Pap smear  1990    Pneumonia hx.    Screening mammogram  1998    Tobacco use disorder        Past Surgical History:   Procedure Laterality Date    LUMBAR LAMINECTOMY DISKECTOMY  3/18/2009    Performed by RIP [facility] at [facility] ORS    TUBAL COAGULATION LAPAROSCOPIC BILATERAL         Social History     Socioeconomic History    Marital status:      Spouse name: Not on file    Number of children: Not on file    Years of education: Not on file    Highest education level: Some college, no degree   Occupational History    Not on file   Tobacco Use    Smoking status: Every Day     Packs/day: 0.25     Years: 30.00     Pack years: 7.50     Types: Cigarettes    Smokeless tobacco: Never   Vaping Use    Vaping Use: Never used   Substance and Sexual Activity    Alcohol use: Yes     Comment: ocassional    Drug use: No     Comment: none currently but in the past did.    Sexual activity: Not Currently     Comment:    Other  "Topics Concern    Not on file   Social History Narrative    Not on file     Social Determinants of Health     Financial Resource Strain: Not on file   Food Insecurity: Not on file   Transportation Needs: Not on file   Physical Activity: Not on file   Stress: Not on file   Social Connections: Not on file   Intimate Partner Violence: Not on file   Housing Stability: Not on file        Family History   Problem Relation Age of Onset    Cancer Mother     Diabetes Maternal Grandmother     Cancer Maternal Grandmother         Allergies   Allergen Reactions    Sulfa Drugs Hives     Causes angioderma and hives       Review of Systems   Musculoskeletal:  Positive for back pain.   All other systems reviewed and are negative.     Objective:   BP (!) 140/78   Pulse 80   Temp 36.4 °C (97.5 °F)   Ht 1.6 m (5' 3\")   Wt 71.2 kg (157 lb)   LMP 08/01/2013   BMI 27.81 kg/m²     Physical Exam  Vitals reviewed.   Constitutional:       General: She is not in acute distress.     Appearance: She is well-developed.   HENT:      Head: Normocephalic and atraumatic.      Right Ear: External ear normal.      Left Ear: External ear normal.      Nose: Nose normal.      Mouth/Throat:      Mouth: Mucous membranes are moist.   Eyes:      Conjunctiva/sclera: Conjunctivae normal.   Cardiovascular:      Rate and Rhythm: Normal rate.   Pulmonary:      Effort: Pulmonary effort is normal.      Breath sounds: Normal breath sounds.   Musculoskeletal:         General: Normal range of motion.      Cervical back: Normal range of motion and neck supple.   Skin:     General: Skin is warm and dry.      Findings: No rash.   Neurological:      General: No focal deficit present.      Mental Status: She is alert and oriented to person, place, and time.      GCS: GCS eye subscore is 4. GCS verbal subscore is 5. GCS motor subscore is 6.   Psychiatric:         Mood and Affect: Mood normal.         Speech: Speech normal.         Behavior: Behavior normal.         " Thought Content: Thought content normal.         Judgment: Judgment normal.       Assessment/Plan:   1. Encounter for physical examination related to employment  - Quantiferon Gold Plus TB (4 tube); Future    2. Pre-employment drug screening  - POCT 6 Panel Urine Drug Screen    See scanned documentation for physical and health questionnaire.    Differential diagnosis, natural history, supportive care, and indications for immediate follow-up discussed.   no

## 2023-05-31 NOTE — DISCHARGE NOTE OB - MEDICATION SUMMARY - MEDICATIONS TO TAKE
I will START or STAY ON the medications listed below when I get home from the hospital:    ibuprofen 600 mg oral tablet  -- 1 tab(s) by mouth every 6 hours  -- Indication: For Vaginal delivery    acetaminophen 325 mg oral tablet  -- 3 tab(s) by mouth every 6 hours  -- Indication: For Vaginal delivery

## 2023-05-31 NOTE — CHART NOTE - NSCHARTNOTEFT_GEN_A_CORE
AN  Patient seen   Early labor as per Dr Mahajan  Admit for laobr and arom after epidural possible pitocin  8 1/2#  GA Kay

## 2023-05-31 NOTE — OB RN DELIVERY SUMMARY - NS_SEPSISRSKCALC_OBGYN_ALL_OB_FT
EOS calculated successfully. EOS Risk Factor: 0.5/1000 live births ([facility] national incidence); GA=39w;Temp=98.42; ROM=1.65; GBS='Negative'; Antibiotics='No antibiotics or any antibiotics < 2 hrs prior to birth'

## 2023-05-31 NOTE — OB NEONATOLOGY/PEDIATRICIAN DELIVERY SUMMARY - NSPEDSNEONOTESA_OBGYN_ALL_OB_FT
Peds called to delivery of a 39.0 wk male for Cat II tracing. Baby born to a 38 y/o  mother via . Maternal history significant for chronic Hep B. Pregnancy complicated by GDMA1. Maternal blood type O+. Prenatal labs significant for reactive HepB surface antigen, neg Hep B IgM, pos Hep B core antibody. HIV negative, RPR non-reactive and rubella immune, Hep C nonreactive. GBS negative on . AROM at 19:30, 3 hours prior to delivery, bloody. Baby was born vigorous and crying spontaneously. W/D/S/S. APGARS 8/8 for color. Due to persistent cyanosis, O2 sats were checked and were %. He continued to require intermittent suctioning for copious amniotic fluid, but he did not have increased work of breathing. Highest maternal temp 36.9C; EOS: 0.06. Mom would like to breast/bottle feed, would like Hep B vaccine and circ. BW 3260g, AGA.    Gen: NAD; well-appearing  HEENT: NC/AT; AFOF; ears and nose clinically patent, normally set; no tags ; no cleft lip/palate, oropharynx clear with intermittent amniotic fluid, mucous membranes moist  Skin: persistent acrocyanosis, warm, well-perfused, no rash  Resp: CTAB, even, non-labored breathing  Cardiac: RRR, normal S1/S2; no murmurs; 2+ femoral pulses b/l  Abd: soft, NT/ND; +BS; no HSM, no masses palpated; umbilicus c/d/I, 3 vessels  Back: spine straight, no dimples or liz  Extremities: FROM; no crepitus; negative O/B  : Dusty I; no abnormalities; no hernia; anus patent  Neuro: normal tone; + Kaushal, suck, grasp, Babinski

## 2023-05-31 NOTE — OB RN PATIENT PROFILE - NS_PRENATALLABSOURCEGBS1PN_OBGYN_ALL_OB
64 yo M with HTN, DM2, HLD, and sciatica admitted for management of lower back pain MRI with congenital central spinal canal stenosis with superimposed degenerative changes contributing to severe central canal stenosis at L4-5 & extruded disc fragment versus sequestered fragment resulting compression of the thecal sac at L5-S1 seen by ortho, no intervention.
66 y/o Male w/ a pmh significant for HTN, DM2, HLD, and sciatica admitted for management of lower back pain
66 yo M with HTN, DM2, HLD, and sciatica admitted for management of lower back pain MRI with congenital central spinal canal stenosis with superimposed degenerative changes contributing to severe central canal stenosis at L4-5 & extruded disc fragment versus sequestered fragment resulting compression of the thecal sac at L5-S1 seen by ortho, no intervention.
Lumbar radiculopathy, spinal stenosis   Continue steroids-> would dc on 10 day taper   Pain- gabapentin to 300 mh TID for 3 days, then 600 mg TID  Lidoderm patch   needs outpt PT
unknown

## 2023-05-31 NOTE — OB RN TRIAGE NOTE - FALL HARM RISK - UNIVERSAL INTERVENTIONS
Bed in lowest position, wheels locked, appropriate side rails in place/Call bell, personal items and telephone in reach/Instruct patient to call for assistance before getting out of bed or chair/Non-slip footwear when patient is out of bed/[place] to call system/Physically safe environment - no spills, clutter or unnecessary equipment/Purposeful Proactive Rounding/Room/bathroom lighting operational, light cord in reach

## 2023-05-31 NOTE — OB RN PATIENT PROFILE - INTERNATIONAL TRAVEL
NEUROSURGERY    HISTORY AND PHYSICAL EXAM    Chief Complaint   Patient presents with     Follow Up     HISTORY OF PRESENT ILLNESS  Mr. Butt is a 74 year old male with with history of essential tremor, now s/p right side deep brain stimulator placement, phase I & II combined, placement of right side deep brain stimulator electrode, target right ventral intermediate nucleus of the thalamus, with microelectrode recording and connection to existing generator/battery on 4/11/2023. He returns to clinic today for post operative follow up. Of note, he is s/p left ViM DBS (BSci Genus R16 with two extensions) in August and October 2022. His daughter, Svitlana, presents to the appointment with Arley today. His daughter had noticed that he has had some expressive issues while communicating, have a hard time expressing himself, and difficulty processing quickly. She does state that this had slightly improved since immediately post operatively.  She also notes that his balance has improved since discharge from the hospital. He will use a walker to ambulate longer distances.  He denies any dizziness. He notes that he is eating well and not vomiting. He is sleeping well and is awake and active throughout the day. He denies any vision changes. He states that he has some clumping when he reads and the words get mixed up, they note this happened last time and as he healed it improved. They note it is mildly better than prior. He notes that his blood pressure has continued to remain low, and has typically been low, especially following surgery. He notes that he has been feeling well, no dizziness, weakness, numbness or tingling. Neurology appt in June. He has not had any fevers, no redness, swelling or drainage from incision. He has been trying to wash daily, notes that his incision has been itchy, although he is trying not to scratch.    Past Medical History:   Diagnosis Date     Alcohol dependence (H)      Anemia      Anxiety      BPH  (benign prostatic hyperplasia)      Cataract      Chronic pain      DVT (deep venous thrombosis) (H)      Dysphagia      Dysthymia      Erosive gastritis      Esophagitis      Essential tremor      Follicular lymphoma (H)      Foot sprain      Gastroesophageal reflux disease with esophagitis      HTN (hypertension)      Hyperlipidemia      Impacted cerumen      MCI (mild cognitive impairment)      Osteoarthritis      Presbyopia      PTSD (post-traumatic stress disorder)      Sensorineural hearing loss, bilateral      Tinnitus, bilateral      Urinary frequency        Past Surgical History:   Procedure Laterality Date     ANKLE SURGERY Right      BACK SURGERY      lower back, herniated disc     CYSTECTOMY      pilonidal     IMPLANT DEEP BRAIN STIMULATION GENERATOR / BATTERY Left 10/4/2022    Procedure: Deep brain stimulator placement, phase II, placement of deep brain stimulator generator/battery over the left chest wall;  Surgeon: Manuel Otero MD;  Location: [facility] OR     OPTICAL TRACKING SYSTEM INSERTION DEEP BRAIN STIMULATION Left 9/26/2022    Procedure: stealth assisted Left side deep brain stimulator placement, phase I, placement of left side deep brain stimulator electrode, target left ventral intermediate nucleus of the thalamus, with microelectrode recording;  Surgeon: Manuel Otero MD;  Location: [facility] OR     OPTICAL TRACKING SYSTEM INSERTION DEEP BRAIN STIMULATION Right 4/11/2023    Procedure: stealth assisted Right side deep brain stimulator placement, phase I & II combined, placement of right side deep brain stimulator electrode, target right ventral intermediate nucleus of the thalamus, with microelectrode recording and connection to existing generator/battery;  Surgeon: Manuel Otero MD;  Location: [facility] OR     VASECTOMY         Social History     Socioeconomic History     Marital status:      Spouse name: Not on file     Number of children: Not on file     Years of education: Not on file      Highest education level: Not on file   Occupational History     Not on file   Tobacco Use     Smoking status: Former     Types: Cigarettes     Passive exposure: Past     Smokeless tobacco: Never   Vaping Use     Vaping status: Not on file   Substance and Sexual Activity     Alcohol use: Not Currently     Drug use: Never     Sexual activity: Not on file   Other Topics Concern     Not on file   Social History Narrative     Not on file     Social Determinants of Health     Financial Resource Strain: Not on file   Food Insecurity: Not on file   Transportation Needs: Not on file   Physical Activity: Not on file   Stress: Not on file   Social Connections: Not on file   Intimate Partner Violence: Not on file   Housing Stability: Not on file       Family History   Problem Relation Age of Onset     Tremor Father      Anesthesia Reaction No family hx of      Clotting Disorder No family hx of        Current Outpatient Medications   Medication     Carboxymethylcellulose Sodium 1 % GEL     cholecalciferol 50 MCG (2000 UT) tablet     cyproheptadine (PERIACTIN) 4 MG tablet     diphenhydrAMINE-acetaminophen (TYLENOL PM)  MG tablet     finasteride (PROSCAR) 5 MG tablet     folic acid (FOLVITE) 1 MG tablet     gabapentin (NEURONTIN) 300 MG capsule     mirtazapine (REMERON) 30 MG tablet     NONFORMULARY     ondansetron (ZOFRAN ODT) 4 MG ODT tab     pantoprazole (PROTONIX) 40 MG EC tablet     polyethylene glycol (MIRALAX) 17 g packet     prazosin (MINIPRESS) 5 MG capsule     sertraline (ZOLOFT) 50 MG tablet     simvastatin (ZOCOR) 20 MG tablet     tamsulosin (FLOMAX) 0.4 MG capsule     vitamin B-12 (CYANOCOBALAMIN) 1000 MCG tablet     acetaminophen (TYLENOL) 325 MG tablet     diphenhydrAMINE (BENADRYL) 25 MG capsule     oxyCODONE (ROXICODONE) 5 MG tablet     No current facility-administered medications for this visit.       No Known Allergies      REVIEW OF SYSTEMS:   ROS: 10 point ROS neg other than the symptoms noted above  "in the HPI.      PHYSICAL EXAM  /69 (BP Location: Right arm, Patient Position: Sitting, Cuff Size: Adult Regular)   Pulse (!) 121   Ht 1.661 m (5' 5.39\")   Wt 92.2 kg (203 lb 4.2 oz)   BMI 33.42 kg/m       General: Awake, alert, oriented, although is very quiet and takes a while to answer questions, slightly dazed appearing. Well nourished, well developed, heis not in any acute distress.  HEENT: Head normocephalic. Good range of motion.   Extremity: Warm No cyanosis. No lower extremity edema.    Incisions: head incision healing well although some scabbed areas noted, no open areas. Sutures removed as patient stated they were itchy, no redness or swelling    Neurological  Awake, alert and oriented to date, time, place and person.  Speech is hesitant but fluent. face symmetric.  Motor: full strength throughout.  Sensation: intact to light touch and pinpoint.      ASSESSMENT   Arley is recovering from recent DBS surgery, due to ongoing confusion and difficulty with expressive speech, I would like to obtain a head CT today and follow up with results.    Imaging:   IMPRESSION:  1. Greater than expected suspected vasogenic edema surrounding the right deep brain stimulator lead. Associated small right isodense subdural collection. Recommend MRI with and without intravenous contrast for further evaluation.  2. Left deep brain stimulator lead appears within normal limits.     PLAN:  1.  Discussed case with Dr. Otero and ordered Decadron taper, I will follow up with them next week and discuss potential MRI brain    " No

## 2023-05-31 NOTE — OB RN DELIVERY SUMMARY - NSSELHIDDEN_OBGYN_ALL_OB_FT
[NS_DeliveryAttending1_OBGYN_ALL_OB_FT:[id number]=],[NS_DeliveryRN_OBGYN_ALL_OB_FT:[email or web address]=] [NS_DeliveryAttending1_OBGYN_ALL_OB_FT:[id number]=],[NS_DeliveryRN_OBGYN_ALL_OB_FT:[email or web address]=],[NS_DeliveryAssist1_OBGYN_ALL_OB_FT:[id number]=]

## 2023-05-31 NOTE — OB RN PATIENT PROFILE - NS_GESTAGE_OBGYN_ALL_OB_FT
39w Doxepin Pregnancy And Lactation Text: This medication is Pregnancy Category C and it isn't known if it is safe during pregnancy. It is also excreted in breast milk and breast feeding isn't recommended.

## 2023-05-31 NOTE — OB PROVIDER H&P - ASSESSMENT
36yo  at 39w in labor   - Admit to L&D  - Routine labs   - EFM & toco  - NPO & IVF  - FS q4hr in latent labor  - GBS neg         - Epidural PRN  - Pitocin for augmentation    D/w Dr. Rahul Valdivia, PGY2

## 2023-05-31 NOTE — OB PROVIDER LABOR PROGRESS NOTE - NS_SUBJECTIVE/OBJECTIVE_OBGYN_ALL_OB_FT
Pt seen and examined for labor progress, comfortable with epidural  VSS  Cat 1   [place]: q2-4  VE: 4.5/70/-2
R1 Labor Note    S: Patient evaluated at bedside for cervical change and AROM.    O:  T(C): 36.7 (05-31-23 @ 17:00), Max: 36.9 (05-31-23 @ 11:32)  HR: 96 (05-31-23 @ 19:36) (80 - 106)  BP: 115/68 (05-31-23 @ 19:27) (95/64 - 131/82)  RR: 17 (05-31-23 @ 17:00) (16 - 18)  SpO2: 98% (05-31-23 @ 19:36) (97% - 100%)

## 2023-05-31 NOTE — DISCHARGE NOTE OB - CARE PROVIDER_API CALL
Rafael Mahajan  Maternal/Fetal Medicine  [address], [address]  [place] [address]  Phone: [phone]  Fax: [phone]  Follow Up Time:

## 2023-05-31 NOTE — OB PROVIDER LABOR PROGRESS NOTE - ASSESSMENT
A1 IOL, now comfortable, for Pitocin, overall reassuring fetal status  Start Pitocin  Reposition  Reassess in 2-4 hrs/PRN  ADITHYA Lance

## 2023-05-31 NOTE — DISCHARGE NOTE OB - CARE PLAN
1 Principal Discharge DX:	Vaginal delivery  Assessment and plan of treatment:	Nothing per vagina  No tub soaking or heavy lifting

## 2023-05-31 NOTE — OB RN TRIAGE NOTE - PRO MENTAL HEALTH SX RECENT
This office note has been dictated.     Procedure: Hallux Valgus Lapidus Type 94224, EDL lengthening/capsulotomy 30278 and Other Single Metatarsal Osteotomy 37943 and Gastrocnemius Recession 65674  Duration of Procedure: 60 minutes  Hospital: [facility]  Anesthesia: General, Indwelling Saphenous/Popliteal Block  Length of Stay: [facility]  Patient Position: Supine  Equipment: Modular Foot Set and C-Arm none

## 2023-05-31 NOTE — OB PROVIDER DELIVERY SUMMARY - NSPROVIDERDELIVERYNOTE_OBGYN_ALL_OB_FT
Spontaneous vaginal delivery of liveborn infant from BLAINE position. Head, shoulders, and body delivered easily. Infant passed to mother. Delayed cord clamping. Cord was clamped and cut. Placenta delivered spontaneously, noted to be intact. Fundal massage was given and uterine fundus was found to be firm. Vaginal exam revealed intact cervix, sulci, vaginal walls and perineum. Excellent hemostasis was noted. Patient stable. Count correct x 2. Delivery with Dr. Horan.    Ami White, PGY-1

## 2023-05-31 NOTE — OB PROVIDER H&P - HISTORY OF PRESENT ILLNESS
38yo  at 39w presenting in labor - painful CTX and requesting epidural.     -VB, -LOF, -Ctx, +FM. Denies fever, chills, nausea, vomiting, diarrhea, headache, constipation, dizziness, syncope, chest pain, palpitations, shortness of breath, dysuria, urgency, frequency.    PNC c/b A1  GBS: neg   EFW: 3629 (8lb per pt)     ObHx:   2007 -  @ , 7#  2019 -  @ , 7#13, c/b A1   -  @ , 7# c/b A1   SAB x4, D&Cx1   GynHx: H/o [facility] LS for "infection"   MedHx: H/o chronic hepatitis B   SrgHx: C LS   PsychHx: denies  SocialHx: denies  AllergyHx: denies  RxHx: denies   36yo  at 39w presenting in labor - painful CTX and requesting epidural.     -VB, -LOF, +FM. Denies fever, chills, nausea, vomiting, diarrhea, headache, constipation, dizziness, syncope, chest pain, palpitations, shortness of breath, dysuria, urgency, frequency.    PNC c/b A1  GBS: neg   EFW: 3629 (8lb per pt)     ObHx:   2007 -  @ FT, 7#  2019 -  @ FT, 7#13, c/b A1   -  @ FT, 7# c/b A1   SAB x4, D&Cx1   GynHx: H/o [facility] LS for "infection"   MedHx: H/o chronic hepatitis B   SrgHx: [facility] LS   PsychHx: denies  SocialHx: denies  AllergyHx: denies  RxHx: denies

## 2023-05-31 NOTE — DISCHARGE NOTE OB - PATIENT PORTAL LINK FT
You can access the FollowMyHealth Patient Portal offered by [facility] by registering at the following website: [email or web address]. By joining [employer]’s FollowMyHealth portal, you will also be able to view your health information using other applications (apps) compatible with our system.

## 2023-05-31 NOTE — OB RN TRIAGE NOTE - NSICDXPASTSURGICALHX_GEN_ALL_CORE_FT
PAST SURGICAL HISTORY:  Female infertility s/p 1 IVF Dec 3rd 2019    H/O unilateral salpingectomy     S/P D&C (status post dilation and curettage)

## 2023-05-31 NOTE — OB PROVIDER H&P - NSLOWPPHRISK_OBGYN_A_OB
No previous uterine incision/Leon Pregnancy/Less than or equal to 4 previous vaginal births/No known bleeding disorder/No history of postpartum hemorrhage/No other PPH risks indicated

## 2023-05-31 NOTE — DISCHARGE NOTE OB - NS MD DC FALL RISK RISK
For information on Fall & Injury Prevention, visit: [email or web address] OR  [email or web address] OR  https://www.cdc.gov/steadi/patient.html

## 2023-05-31 NOTE — OB RN TRIAGE NOTE - NSICDXPASTMEDICALHX_GEN_ALL_CORE_FT
PAST MEDICAL HISTORY:  Hepatitis B chronic Hep B    Infertility, female     Miscarriage     Missed      Normal vaginal delivery 2007

## 2023-05-31 NOTE — OB PROVIDER DELIVERY SUMMARY - NSSELHIDDEN_OBGYN_ALL_OB_FT
[NS_DeliveryAttending1_OBGYN_ALL_OB_FT:[id number]=],[NS_DeliveryRN_OBGYN_ALL_OB_FT:[id number]=],[NS_DeliveryAssist1_OBGYN_ALL_OB_FT:[id number]=]

## 2023-05-31 NOTE — OB RN PATIENT PROFILE - BREAST MILK IS MORE DIGESTIBLE, MAKING VOMITING, DIARRHEA, GAS AND CONSTIPATION LESS COMMON
[General Appearance - Well Developed] : well developed [Normal Appearance] : normal appearance [Well Groomed] : well groomed [General Appearance - Well Nourished] : well nourished [No Deformities] : no deformities [General Appearance - In No Acute Distress] : no acute distress [Normal Conjunctiva] : the conjunctiva exhibited no abnormalities [Eyelids - No Xanthelasma] : the eyelids demonstrated no xanthelasmas [Normal Oral Mucosa] : normal oral mucosa [No Oral Pallor] : no oral pallor [No Oral Cyanosis] : no oral cyanosis [Normal Jugular Venous V Waves Present] : normal jugular venous V waves present [Normal Jugular Venous A Waves Present] : normal jugular venous A waves present [No Jugular Venous Fields A Waves] : no jugular venous fields A waves [Exaggerated Use Of Accessory Muscles For Inspiration] : no accessory muscle use [Respiration, Rhythm And Depth] : normal respiratory rhythm and effort [Auscultation Breath Sounds / Voice Sounds] : lungs were clear to auscultation bilaterally [Heart Sounds] : normal S1 and S2 [Heart Rate And Rhythm] : heart rate and rhythm were normal [Murmurs] : no murmurs present [Abdomen Soft] : soft [Abdomen Tenderness] : non-tender [Abdomen Mass (___ Cm)] : no abdominal mass palpated [Gait - Sufficient For Exercise Testing] : the gait was sufficient for exercise testing [Abnormal Walk] : normal gait [Cyanosis, Localized] : no localized cyanosis [Nail Clubbing] : no clubbing of the fingernails [Petechial Hemorrhages (___cm)] : no petechial hemorrhages [Skin Color & Pigmentation] : normal skin color and pigmentation [No Venous Stasis] : no venous stasis [] : no rash Statement Selected [Skin Lesions] : no skin lesions [No Skin Ulcers] : no skin ulcer [No Xanthoma] : no  xanthoma was observed

## 2023-06-01 LAB — T PALLIDUM AB TITR SER: NEGATIVE — SIGNIFICANT CHANGE UP

## 2023-06-01 RX ORDER — ACETAMINOPHEN 500 MG
3 TABLET ORAL
Qty: 0 | Refills: 0 | DISCHARGE
Start: 2023-06-01

## 2023-06-01 RX ORDER — IBUPROFEN 200 MG
1 TABLET ORAL
Qty: 0 | Refills: 0 | DISCHARGE
Start: 2023-06-01

## 2023-06-01 RX ADMIN — Medication 600 MILLIGRAM(S): at 16:50

## 2023-06-01 RX ADMIN — Medication 975 MILLIGRAM(S): at 14:23

## 2023-06-01 RX ADMIN — Medication 975 MILLIGRAM(S): at 20:50

## 2023-06-01 RX ADMIN — Medication 975 MILLIGRAM(S): at 13:23

## 2023-06-01 RX ADMIN — SODIUM CHLORIDE 3 MILLILITER(S): 9 INJECTION INTRAMUSCULAR; INTRAVENOUS; SUBCUTANEOUS at 06:13

## 2023-06-01 RX ADMIN — Medication 600 MILLIGRAM(S): at 06:30

## 2023-06-01 RX ADMIN — Medication 600 MILLIGRAM(S): at 05:56

## 2023-06-01 RX ADMIN — Medication 1 TABLET(S): at 13:23

## 2023-06-01 RX ADMIN — Medication 975 MILLIGRAM(S): at 21:35

## 2023-06-01 RX ADMIN — Medication 600 MILLIGRAM(S): at 17:50

## 2023-06-01 RX ADMIN — SODIUM CHLORIDE 3 MILLILITER(S): 9 INJECTION INTRAMUSCULAR; INTRAVENOUS; SUBCUTANEOUS at 13:23

## 2023-06-01 RX ADMIN — Medication 975 MILLIGRAM(S): at 00:48

## 2023-06-01 NOTE — PROGRESS NOTE ADULT - SUBJECTIVE AND OBJECTIVE BOX
OB Attending Progress Note:  PPD#1    S: 38yo PPD#1 s/p . Patient feels well. Pain is well controlled. She is tolerating a regular diet and passing flatus. She is voiding spontaneously. and ambulating without difficulty. She is breastfeeding. Denies CP/SOB. Denies lightheadedness/dizziness. Denies N/V/D.    O:  Vitals:  Vital Signs Last 24 Hrs  T(C): 36.8 (2023 06:10), Max: 37.5 (2023 02:15)  T(F): 98.3 (2023 06:10), Max: 99.5 (2023 02:15)  HR: 89 (2023 06:10) (78 - 106)  BP: 99/63 (2023 06:10) (95/64 - 131/82)  BP(mean): --  RR: 18 (2023 06:10) (16 - 18)  SpO2: 99% (2023 06:10) (91% - 100%)    Parameters below as of 2023 06:10  Patient On (Oxygen Delivery Method): room air        MEDICATIONS  (STANDING):  acetaminophen     Tablet .. 975 milliGRAM(s) Oral <User Schedule>  diphtheria/tetanus/pertussis (acellular) Vaccine (Adacel) 0.5 milliLiter(s) IntraMuscular once  ibuprofen  Tablet. 600 milliGRAM(s) Oral every 6 hours  prenatal multivitamin 1 Tablet(s) Oral daily  sodium chloride 0.9% lock flush 3 milliLiter(s) IV Push every 8 hours      Labs:  Blood type: O Positive  Rubella IgG: RPR: Negative                          12.1   8.74 >-----------< 284    ( 31 @ 13:30 )             35.8                  Physical Exam:  General: NAD  CV: RR  Pulm: Breathing comfortably on RA  Abdomen: soft, non-tender, non-distended, +BS, fundus firm  Vaginal: Lochia wnl  Extremities: No erythema/edema    A/P: 38yo PPD#1 s/p .   - Pain well controlled, continue current pain regimen  - Increase ambulation, SCDs when not ambulating  - Continue regular diet  - Discharge planning today per protocol      Devante Morales MD

## 2023-06-02 VITALS
RESPIRATION RATE: 17 BRPM | SYSTOLIC BLOOD PRESSURE: 109 MMHG | TEMPERATURE: 98 F | OXYGEN SATURATION: 99 % | DIASTOLIC BLOOD PRESSURE: 69 MMHG | HEART RATE: 94 BPM

## 2023-06-02 RX ADMIN — Medication 600 MILLIGRAM(S): at 05:45

## 2023-06-02 RX ADMIN — Medication 975 MILLIGRAM(S): at 05:00

## 2023-06-02 RX ADMIN — Medication 975 MILLIGRAM(S): at 04:12

## 2023-06-02 RX ADMIN — Medication 600 MILLIGRAM(S): at 06:20

## 2023-07-12 ENCOUNTER — APPOINTMENT (OUTPATIENT)
Dept: OBGYN | Facility: CLINIC | Age: 38
End: 2023-07-12
Payer: MEDICAID

## 2023-07-12 VITALS
DIASTOLIC BLOOD PRESSURE: 70 MMHG | BODY MASS INDEX: 27.82 KG/M2 | HEIGHT: 59 IN | SYSTOLIC BLOOD PRESSURE: 109 MMHG | WEIGHT: 138 LBS

## 2023-07-12 DIAGNOSIS — N89.8 OTHER SPECIFIED NONINFLAMMATORY DISORDERS OF VAGINA: ICD-10-CM

## 2023-07-12 PROCEDURE — 0503F POSTPARTUM CARE VISIT: CPT

## 2023-07-12 NOTE — HISTORY OF PRESENT ILLNESS
[Delivery Date: ___] : on [unfilled] [] : delivered by vaginal delivery [Male] : Delivery History: baby boy [Wt. ___] : weighing [unfilled] [Back to Normal] : is back to normal in size [Normal] : the vagina was normal [Examination Of The Breasts] : breasts are normal [Doing Well] : is doing well [No Sign of Infection] : is showing no signs of infection [Excellent Pain Control] : has excellent pain control [None] : None [FreeTextEntry8] : post partum [FreeTextEntry9] : GDM [de-identified] : 39 weeks [de-identified] : bottle [FreeTextEntry1] : 38 y/o F presenting for 6 week PP visit:\par -normal PP exam\par -Patient cleared to resume intercourse and exercise\par -Patient counseled on contraception options, desires nothing at this time as she has no fallopian tubes and all her pregnancies have been IVF.\par -Referral for uro/gyn given to patient due to 3cm cyst anterior to clitoris.\par -f/u 3 months for annual

## 2023-08-01 ENCOUNTER — NON-APPOINTMENT (OUTPATIENT)
Age: 38
End: 2023-08-01

## 2023-08-02 ENCOUNTER — APPOINTMENT (OUTPATIENT)
Dept: UROGYNECOLOGY | Facility: CLINIC | Age: 38
End: 2023-08-02

## 2023-08-17 RX ORDER — ASCORBIC ACID, CHOLECALCIFEROL, .ALPHA.-TOCOPHEROL ACETATE, DL-, PYRIDOXINE, FOLIC ACID, CYANOCOBALAMIN, CALCIUM, FERROUS FUMARATE, MAGNESIUM, DOCONEXENT 85; 200; 10; 25; 1; 12; 140; 27; 45; 300 [IU]/1; [IU]/1; [IU]/1; [IU]/1; MG/1; UG/1; MG/1; MG/1; MG/1; MG/1
27-0.6-0.4-3 CAPSULE, GELATIN COATED ORAL
Qty: 30 | Refills: 6 | Status: ACTIVE | COMMUNITY
Start: 2023-08-17 | End: 1900-01-01

## 2023-08-31 ENCOUNTER — APPOINTMENT (OUTPATIENT)
Dept: ULTRASOUND IMAGING | Facility: CLINIC | Age: 38
End: 2023-08-31
Payer: MEDICAID

## 2023-08-31 ENCOUNTER — APPOINTMENT (OUTPATIENT)
Dept: SURGERY | Facility: CLINIC | Age: 38
End: 2023-08-31
Payer: MEDICAID

## 2023-08-31 ENCOUNTER — OUTPATIENT (OUTPATIENT)
Dept: OUTPATIENT SERVICES | Facility: HOSPITAL | Age: 38
LOS: 1 days | End: 2023-08-31
Payer: MEDICAID

## 2023-08-31 VITALS
WEIGHT: 138 LBS | SYSTOLIC BLOOD PRESSURE: 115 MMHG | BODY MASS INDEX: 27.09 KG/M2 | DIASTOLIC BLOOD PRESSURE: 77 MMHG | HEART RATE: 77 BPM | HEIGHT: 60 IN

## 2023-08-31 DIAGNOSIS — Z90.79 ACQUIRED ABSENCE OF OTHER GENITAL ORGAN(S): Chronic | ICD-10-CM

## 2023-08-31 DIAGNOSIS — Z98.890 OTHER SPECIFIED POSTPROCEDURAL STATES: Chronic | ICD-10-CM

## 2023-08-31 DIAGNOSIS — E04.2 NONTOXIC MULTINODULAR GOITER: ICD-10-CM

## 2023-08-31 DIAGNOSIS — N97.9 FEMALE INFERTILITY, UNSPECIFIED: Chronic | ICD-10-CM

## 2023-08-31 PROCEDURE — 76536 US EXAM OF HEAD AND NECK: CPT | Mod: 26

## 2023-08-31 PROCEDURE — 99204 OFFICE O/P NEW MOD 45 MIN: CPT

## 2023-08-31 PROCEDURE — 76536 US EXAM OF HEAD AND NECK: CPT

## 2023-08-31 NOTE — CONSULT LETTER
[Dear  ___] : Dear  [unfilled], [Consult Letter:] : I had the pleasure of evaluating your patient, [unfilled]. [Please see my note below.] : Please see my note below. [Consult Closing:] : Thank you very much for allowing me to participate in the care of this patient.  If you have any questions, please do not hesitate to contact me. [Sincerely,] : Sincerely, [FreeTextEntry2] : Dr. Ciaran Mars, Dr. Lorenzo Thomson [FreeTextEntry3] : Tomas Lopez MD, FACS System Director, Endocrine Surgery [facility] Associate  Professor of Surgery [employer] at [facility] [DrToribio  ___] : Dr. SOUZA

## 2023-08-31 NOTE — PHYSICAL EXAM
[de-identified] : 3 cm right and 5 cm left thyroid nodules, well circumscribed and mobile [Laryngoscopy Performed] : laryngoscopy was performed, see procedure section for findings [Midline] : located in midline position [Normal] : orientation to person, place, and time: normal [de-identified] : indirect laryngoscopy shows normal vocal cord mobility bilaterally with no lesions noted

## 2023-08-31 NOTE — HISTORY OF PRESENT ILLNESS
[de-identified] : long standing history of thyroid nodules. notes difficulty swallowing. denies hoarseness or RT exposure.  3 months postpartum.  normal TFTs.  sonogram shows multiple bilateral thyroid nodules up to 2.9 cm right and 3 cm left.  FNA (CBL) left nodules benign.  I have reviewed all old and new data and available images.

## 2023-08-31 NOTE — ASSESSMENT
[FreeTextEntry1] : bloods drawn. sonogram requested. to call next week for results. may need to consider surgery. patient has been given the opportunity to ask questions, and all of the patient's questions have been answered to their satisfaction

## 2023-09-02 LAB
CALCIUM SERPL-MCNC: 9.8 MG/DL
CALCIUM SERPL-MCNC: 9.8 MG/DL
PARATHYROID HORMONE INTACT: 21 PG/ML
T3 SERPL-MCNC: 101 NG/DL
T4 FREE SERPL-MCNC: 1.1 NG/DL
THYROGLOB AB SERPL-ACNC: <20 IU/ML
THYROPEROXIDASE AB SERPL IA-ACNC: <10 IU/ML
TSH SERPL-ACNC: 0.33 UIU/ML

## 2023-10-03 ENCOUNTER — RX RENEWAL (OUTPATIENT)
Age: 38
End: 2023-10-03

## 2023-10-05 ENCOUNTER — RESULT REVIEW (OUTPATIENT)
Age: 38
End: 2023-10-05

## 2023-10-09 ENCOUNTER — APPOINTMENT (OUTPATIENT)
Dept: OBGYN | Facility: CLINIC | Age: 38
End: 2023-10-09
Payer: MEDICAID

## 2023-10-09 VITALS
HEIGHT: 60 IN | WEIGHT: 142 LBS | BODY MASS INDEX: 27.88 KG/M2 | SYSTOLIC BLOOD PRESSURE: 114 MMHG | DIASTOLIC BLOOD PRESSURE: 74 MMHG

## 2023-10-09 DIAGNOSIS — Z3A.36 36 WEEKS GESTATION OF PREGNANCY: ICD-10-CM

## 2023-10-09 DIAGNOSIS — Z86.32 PERSONAL HISTORY OF GESTATIONAL DIABETES: ICD-10-CM

## 2023-10-09 DIAGNOSIS — Z33.1 PREGNANT STATE, INCIDENTAL: ICD-10-CM

## 2023-10-09 DIAGNOSIS — Z3A.25 25 WEEKS GESTATION OF PREGNANCY: ICD-10-CM

## 2023-10-09 DIAGNOSIS — Z01.419 ENCOUNTER FOR GYNECOLOGICAL EXAMINATION (GENERAL) (ROUTINE) W/OUT ABNORMAL FINDINGS: ICD-10-CM

## 2023-10-09 PROCEDURE — 99395 PREV VISIT EST AGE 18-39: CPT

## 2023-10-09 RX ORDER — BLOOD-GLUCOSE METER
W/DEVICE KIT MISCELLANEOUS
Qty: 1 | Refills: 0 | Status: DISCONTINUED | COMMUNITY
Start: 2023-02-24 | End: 2023-10-09

## 2023-10-09 RX ORDER — ASCORBIC ACID, CHOLECALCIFEROL, .ALPHA.-TOCOPHEROL ACETATE, DL-, PYRIDOXINE, FOLIC ACID, CYANOCOBALAMIN, CALCIUM, FERROUS FUMARATE, MAGNESIUM, DOCONEXENT 85; 200; 10; 25; 1; 12; 140; 27; 45; 300 [IU]/1; [IU]/1; [IU]/1; [IU]/1; MG/1; UG/1; MG/1; MG/1; MG/1; MG/1
27-0.6-0.4-3 CAPSULE, GELATIN COATED ORAL
Qty: 30 | Refills: 9 | Status: DISCONTINUED | COMMUNITY
Start: 2022-04-12 | End: 2023-10-09

## 2023-10-09 RX ORDER — ISOPROPYL ALCOHOL 0.7 ML/ML
SWAB TOPICAL
Qty: 4 | Refills: 2 | Status: DISCONTINUED | COMMUNITY
Start: 2023-02-24 | End: 2023-10-09

## 2023-10-09 RX ORDER — LANCETS 28 GAUGE
EACH MISCELLANEOUS
Qty: 2 | Refills: 3 | Status: DISCONTINUED | COMMUNITY
Start: 2023-02-24 | End: 2023-10-09

## 2023-10-09 RX ORDER — BLOOD SUGAR DIAGNOSTIC
STRIP MISCELLANEOUS 4 TIMES DAILY
Qty: 2 | Refills: 2 | Status: DISCONTINUED | COMMUNITY
Start: 2023-02-24 | End: 2023-10-09

## 2023-10-09 RX ORDER — ASCORBIC ACID, CHOLECALCIFEROL, .ALPHA.-TOCOPHEROL ACETATE, DL-, PYRIDOXINE, FOLIC ACID, CYANOCOBALAMIN, CALCIUM, FERROUS FUMARATE, MAGNESIUM, DOCONEXENT 85; 200; 10; 25; 1; 12; 140; 27; 45; 300 [IU]/1; [IU]/1; [IU]/1; [IU]/1; MG/1; UG/1; MG/1; MG/1; MG/1; MG/1
27-0.6-0.4-3 CAPSULE, GELATIN COATED ORAL
Qty: 30 | Refills: 6 | Status: DISCONTINUED | COMMUNITY
Start: 2023-01-10 | End: 2023-10-09

## 2023-10-09 RX ORDER — FERROUS SULFATE TAB EC 325 MG (65 MG FE EQUIVALENT) 325 (65 FE) MG
325 (65 FE) TABLET DELAYED RESPONSE ORAL DAILY
Qty: 30 | Refills: 6 | Status: DISCONTINUED | COMMUNITY
Start: 2023-02-24 | End: 2023-10-09

## 2023-10-10 ENCOUNTER — NON-APPOINTMENT (OUTPATIENT)
Age: 38
End: 2023-10-10

## 2023-10-10 LAB
ALBUMIN SERPL ELPH-MCNC: 4.5 G/DL
ALP BLD-CCNC: 50 U/L
ALT SERPL-CCNC: 31 U/L
ANION GAP SERPL CALC-SCNC: 12 MMOL/L
AST SERPL-CCNC: 21 U/L
BILIRUB SERPL-MCNC: 0.2 MG/DL
BUN SERPL-MCNC: 17 MG/DL
C TRACH RRNA SPEC QL NAA+PROBE: NOT DETECTED
CALCIUM SERPL-MCNC: 9.4 MG/DL
CHLORIDE SERPL-SCNC: 104 MMOL/L
CO2 SERPL-SCNC: 22 MMOL/L
CREAT SERPL-MCNC: 0.57 MG/DL
EGFR: 121 ML/MIN/1.73M2
ESTIMATED AVERAGE GLUCOSE: 94 MG/DL
FSH SERPL-MCNC: 5 IU/L
GLUCOSE SERPL-MCNC: 87 MG/DL
HBA1C MFR BLD HPLC: 4.9 %
HBV SURFACE AG SER QL: REACTIVE
HIV1+2 AB SPEC QL IA.RAPID: NONREACTIVE
HPV HIGH+LOW RISK DNA PNL CVX: NOT DETECTED
HPV HIGH+LOW RISK DNA PNL CVX: NOT DETECTED
HSV 1+2 IGG SER IA-IMP: NEGATIVE
HSV 1+2 IGG SER IA-IMP: POSITIVE
HSV1 IGG SER QL: 38 INDEX
HSV2 IGG SER QL: 0.25 INDEX
LH SERPL-ACNC: 11.4 IU/L
N GONORRHOEA RRNA SPEC QL NAA+PROBE: NOT DETECTED
POTASSIUM SERPL-SCNC: 4.6 MMOL/L
PROLACTIN SERPL-MCNC: 30.5 NG/ML
PROT SERPL-MCNC: 7.2 G/DL
SODIUM SERPL-SCNC: 138 MMOL/L
SOURCE AMPLIFICATION: NORMAL
T PALLIDUM AB SER QL IA: NEGATIVE
T4 FREE SERPL-MCNC: 1.1 NG/DL
T4 SERPL-MCNC: 6 UG/DL
TESTOST SERPL-MCNC: 10.1 NG/DL
TSH SERPL-ACNC: 0.48 UIU/ML

## 2023-10-12 LAB
HPV 16 E6+E7 MRNA CVX QL NAA+PROBE: NOT DETECTED
HPV18+45 E6+E7 MRNA CVX QL NAA+PROBE: NOT DETECTED

## 2023-10-12 NOTE — OB RN PATIENT PROFILE - EDUCATION ON THE POTENTIAL RISKS AND IMPACT OF EARLY USE OF PACIFIERS ON THE ESTABLISHMENT OF BREASTFEEDING
Patient Education     Pharyngitis: Strep Confirmed (Child)  Pharyngitis is a sore throat. Sore throat is a common condition in children. It can be caused by an infection with the bacterium streptococcus. This is commonly known as strep throat.  Strep throat starts suddenly. Symptoms include a red, swollen throat and swollen lymph nodes, which make it painful to swallow. Red spots may appear on the roof of the mouth. Some children will be flushed and have a fever. Young children may not show that they feel pain. But they may refuse to eat or drink, or drool a lot.  Testing has confirmed strep throat. Antibiotic treatment has been prescribed. This treatment may be given by injection or pills. Children with strep throat are contagious until they have been taking an antibiotic for 24 hours.   Home care  Medicines  Follow these guidelines when giving your child medicine at home:    The healthcare provider has prescribed an antibiotic to treat the infection and possibly medicine to treat a fever. Follow the provider s instructions for giving these medicines to your child. Make sure your child takes the medicine every day until it is gone. You should not have any left over.     If your child has pain or fever, you can give him or her medicine as advised by the healthcare provider.      Don't give your child any other medicine without first asking the healthcare provider.    If your child received an antibiotic shot, your child should not need any other antibiotics.  Follow these tips when giving fever medicine to a usually healthy child:    Don t give ibuprofen to children younger than 6 months old. Also don t give ibuprofen to an older child who is vomiting constantly and is dehydrated.    Read the label before giving fever medicine. This is to make sure that you are giving the right dose. The dose should be right for your child s age and weight.    If your child is taking other medicine, check the list of ingredients.  Look for acetaminophen or ibuprofen. If the medicine contains either of these, tell your child s healthcare provider before giving your child the medicine. This is to prevent a possible overdose.    If your child is younger than 2 years, talk with your child s healthcare provider before giving any medicines to find out the right medicine to use and how much to give.    Don t give aspirin to a child younger than 19 years old who is ill with a fever. Aspirin can cause serious side effects such as liver damage and Reye syndrome. Although rare, Reye syndrome is a very serious illness usually found in children younger than age 15. The syndrome is closely linked to the use of aspirin or aspirin-containing medicines during viral infections.  General care    Wash your hands with warm water and soap before and after caring for your child. This is to help prevent the spread of infection. Others should do the same.    Limit your child's contact with others until he or she is no longer contagious. This is 24 hours after starting antibiotics or as advised by your child s provider. Keep him or her home from school or day care.    Give your child plenty of time to rest.    Encourage your child to drink liquids.    Don t force your child to eat. If your child feels like eating, don t give him or her salty or spicy foods. These can irritate the throat.    Older children may prefer ice chips, cold drinks, frozen desserts, or popsicles.    Older children may also like warm chicken soup or beverages with lemon and honey. Don t give honey to a child younger than 1 year old.    Older children may gargle with warm salt water to ease throat pain. Have your child spit out the gargle afterward and not swallow it.     Tell people who may have had contact with your child about his or her illness. This may include school officials and  center workers.   Follow-up care  Follow up with your child s healthcare provider, or as advised.  When  to seek medical advice  Call your child's healthcare provider right away if any of these occur:    Fever (see Fever and children, below)    Symptoms don t get better after taking prescribed medicine or seem to be getting worse    New or worsening ear pain, sinus pain, or headache    Painful lumps in the back of neck    Lymph nodes are getting larger     Your child can t swallow liquids, has lots of drooling, or can t open his or her mouth wide because of throat pain    Signs of dehydration. These include very dark urine or no urine, sunken eyes, and dizziness.    Noisy breathing    Muffled voice    New rash  Call 911  Call 911 if your child has any of these:    Fever and your child has been in a very hot place such as an overheated car    Trouble breathing    Confusion    Feeling drowsy or having trouble waking up    Unresponsive    Fainting or loss of consciousness    Fast (rapid) heart rate    Seizure    Stiff neck  Fever and children  Always use a digital thermometer to check your child s temperature. Never use a mercury thermometer.  For infants and toddlers, be sure to use a rectal thermometer correctly. A rectal thermometer may accidentally poke a hole in (perforate) the rectum. It may also pass on germs from the stool. Always follow the product maker s directions for proper use. If you don t feel comfortable taking a rectal temperature, use another method. When you talk to your child s healthcare provider, tell him or her which method you used to take your child s temperature.  Here are guidelines for fever temperature. Ear temperatures aren t accurate before 6 months of age. Don t take an oral temperature until your child is at least 4 years old.  Infant under 3 months old:    Ask your child s healthcare provider how you should take the temperature.    Rectal or forehead (temporal artery) temperature of 100.4 F (38 C) or higher, or as directed by the provider    Armpit temperature of 99 F (37.2 C) or higher,  or as directed by the provider  Child age 3 to 36 months:    Rectal, forehead (temporal artery), or ear temperature of 102 F (38.9 C) or higher, or as directed by the provider    Armpit temperature of 101 F (38.3 C) or higher, or as directed by the provider  Child of any age:    Repeated temperature of 104 F (40 C) or higher, or as directed by the provider    Fever that lasts more than 24 hours in a child under 2 years old. Or a fever that lasts for 3 days in a child 2 years or older.   Date Last Reviewed: 5/1/2017 2000-2018 [employer]. [address] [place] [address]. All rights reserved. This information is not intended as a substitute for professional medical care. Always follow your healthcare professional's instructions.                    Thank you for choosing [facility]  for your Health Care. It was a pleasure seeing you at your visit today. Please contact us with any questions or concerns you may have.                   Yenni Morillo MD                                  To reach your [facility] care team after hours call:   [phone]    Our clinic hours are:     Monday- 7:30 am - 7:00 pm                             Tuesday through Friday- 7:30 am - 5:00 pm                                        Saturday- 8:00 am - 12:00 pm                  Phone:  [phone]    Our pharmacy hours are:     Monday  8:00 am to 7:00 pm      Tuesday through Friday 8:00am to 6:00pm                        Saturday - 9:00 am to 1:00 pm      Sunday : Closed.              Phone:  [phone]      There is also information available at our web site:  [email or web address]    If your provider ordered any lab tests and you do not receive the results within 10 business days, please call the clinic.    If you need a medication refill please contact your pharmacy.  Please allow 2 business days for your refill to be completed.    Our clinic offers telephone visits and  e visits.  Please ask one of your team members to explain more.      Use [employer] (secure email communication and access to your chart) to send your primary care provider a message or make an appointment. Ask someone on your Team how to sign up for [employer].                Detail Level: Detailed Hide Include Location In Plan Question?: No Statement Selected

## 2023-10-14 LAB — CYTOLOGY CVX/VAG DOC THIN PREP: NORMAL

## 2023-11-02 ENCOUNTER — OUTPATIENT (OUTPATIENT)
Dept: OUTPATIENT SERVICES | Facility: HOSPITAL | Age: 38
LOS: 1 days | End: 2023-11-02
Payer: MEDICAID

## 2023-11-02 ENCOUNTER — RESULT REVIEW (OUTPATIENT)
Age: 38
End: 2023-11-02

## 2023-11-02 ENCOUNTER — APPOINTMENT (OUTPATIENT)
Dept: ULTRASOUND IMAGING | Facility: IMAGING CENTER | Age: 38
End: 2023-11-02
Payer: MEDICAID

## 2023-11-02 DIAGNOSIS — N97.9 FEMALE INFERTILITY, UNSPECIFIED: Chronic | ICD-10-CM

## 2023-11-02 DIAGNOSIS — Z90.79 ACQUIRED ABSENCE OF OTHER GENITAL ORGAN(S): Chronic | ICD-10-CM

## 2023-11-02 DIAGNOSIS — E04.2 NONTOXIC MULTINODULAR GOITER: ICD-10-CM

## 2023-11-02 DIAGNOSIS — Z98.890 OTHER SPECIFIED POSTPROCEDURAL STATES: Chronic | ICD-10-CM

## 2023-11-02 PROCEDURE — 10006 FNA BX W/US GDN EA ADDL: CPT

## 2023-11-02 PROCEDURE — 10005 FNA BX W/US GDN 1ST LES: CPT

## 2023-11-02 PROCEDURE — 88173 CYTOPATH EVAL FNA REPORT: CPT | Mod: 26

## 2023-11-02 PROCEDURE — 88173 CYTOPATH EVAL FNA REPORT: CPT

## 2023-11-02 PROCEDURE — 88172 CYTP DX EVAL FNA 1ST EA SITE: CPT

## 2023-11-07 ENCOUNTER — OUTPATIENT (OUTPATIENT)
Dept: OUTPATIENT SERVICES | Facility: HOSPITAL | Age: 38
LOS: 1 days | End: 2023-11-07

## 2023-11-07 VITALS
HEIGHT: 60 IN | DIASTOLIC BLOOD PRESSURE: 79 MMHG | TEMPERATURE: 98 F | RESPIRATION RATE: 16 BRPM | OXYGEN SATURATION: 98 % | HEART RATE: 77 BPM | WEIGHT: 141.98 LBS | SYSTOLIC BLOOD PRESSURE: 124 MMHG

## 2023-11-07 DIAGNOSIS — Z90.79 ACQUIRED ABSENCE OF OTHER GENITAL ORGAN(S): Chronic | ICD-10-CM

## 2023-11-07 DIAGNOSIS — N97.9 FEMALE INFERTILITY, UNSPECIFIED: Chronic | ICD-10-CM

## 2023-11-07 DIAGNOSIS — E04.2 NONTOXIC MULTINODULAR GOITER: ICD-10-CM

## 2023-11-07 DIAGNOSIS — Z98.890 OTHER SPECIFIED POSTPROCEDURAL STATES: Chronic | ICD-10-CM

## 2023-11-07 LAB
ALBUMIN SERPL ELPH-MCNC: 4.6 G/DL — SIGNIFICANT CHANGE UP (ref 3.3–5)
ALBUMIN SERPL ELPH-MCNC: 4.6 G/DL — SIGNIFICANT CHANGE UP (ref 3.3–5)
ALP SERPL-CCNC: 54 U/L — SIGNIFICANT CHANGE UP (ref 40–120)
ALP SERPL-CCNC: 54 U/L — SIGNIFICANT CHANGE UP (ref 40–120)
ALT FLD-CCNC: 25 U/L — SIGNIFICANT CHANGE UP (ref 4–33)
ALT FLD-CCNC: 25 U/L — SIGNIFICANT CHANGE UP (ref 4–33)
ANION GAP SERPL CALC-SCNC: 12 MMOL/L — SIGNIFICANT CHANGE UP (ref 7–14)
ANION GAP SERPL CALC-SCNC: 12 MMOL/L — SIGNIFICANT CHANGE UP (ref 7–14)
AST SERPL-CCNC: 21 U/L — SIGNIFICANT CHANGE UP (ref 4–32)
AST SERPL-CCNC: 21 U/L — SIGNIFICANT CHANGE UP (ref 4–32)
BILIRUB SERPL-MCNC: <0.2 MG/DL — SIGNIFICANT CHANGE UP (ref 0.2–1.2)
BILIRUB SERPL-MCNC: <0.2 MG/DL — SIGNIFICANT CHANGE UP (ref 0.2–1.2)
BUN SERPL-MCNC: 12 MG/DL — SIGNIFICANT CHANGE UP (ref 7–23)
BUN SERPL-MCNC: 12 MG/DL — SIGNIFICANT CHANGE UP (ref 7–23)
CALCIUM SERPL-MCNC: 9.4 MG/DL — SIGNIFICANT CHANGE UP (ref 8.4–10.5)
CALCIUM SERPL-MCNC: 9.4 MG/DL — SIGNIFICANT CHANGE UP (ref 8.4–10.5)
CHLORIDE SERPL-SCNC: 103 MMOL/L — SIGNIFICANT CHANGE UP (ref 98–107)
CHLORIDE SERPL-SCNC: 103 MMOL/L — SIGNIFICANT CHANGE UP (ref 98–107)
CO2 SERPL-SCNC: 24 MMOL/L — SIGNIFICANT CHANGE UP (ref 22–31)
CO2 SERPL-SCNC: 24 MMOL/L — SIGNIFICANT CHANGE UP (ref 22–31)
CREAT SERPL-MCNC: 0.46 MG/DL — LOW (ref 0.5–1.3)
CREAT SERPL-MCNC: 0.46 MG/DL — LOW (ref 0.5–1.3)
EGFR: 128 ML/MIN/1.73M2 — SIGNIFICANT CHANGE UP
EGFR: 128 ML/MIN/1.73M2 — SIGNIFICANT CHANGE UP
GLUCOSE SERPL-MCNC: 107 MG/DL — HIGH (ref 70–99)
GLUCOSE SERPL-MCNC: 107 MG/DL — HIGH (ref 70–99)
HCT VFR BLD CALC: 37 % — SIGNIFICANT CHANGE UP (ref 34.5–45)
HCT VFR BLD CALC: 37 % — SIGNIFICANT CHANGE UP (ref 34.5–45)
HGB BLD-MCNC: 12.4 G/DL — SIGNIFICANT CHANGE UP (ref 11.5–15.5)
HGB BLD-MCNC: 12.4 G/DL — SIGNIFICANT CHANGE UP (ref 11.5–15.5)
MCHC RBC-ENTMCNC: 30.4 PG — SIGNIFICANT CHANGE UP (ref 27–34)
MCHC RBC-ENTMCNC: 30.4 PG — SIGNIFICANT CHANGE UP (ref 27–34)
MCHC RBC-ENTMCNC: 33.5 GM/DL — SIGNIFICANT CHANGE UP (ref 32–36)
MCHC RBC-ENTMCNC: 33.5 GM/DL — SIGNIFICANT CHANGE UP (ref 32–36)
MCV RBC AUTO: 90.7 FL — SIGNIFICANT CHANGE UP (ref 80–100)
MCV RBC AUTO: 90.7 FL — SIGNIFICANT CHANGE UP (ref 80–100)
NON-GYNECOLOGICAL CYTOLOGY STUDY: SIGNIFICANT CHANGE UP
NON-GYNECOLOGICAL CYTOLOGY STUDY: SIGNIFICANT CHANGE UP
NRBC # BLD: 0 /100 WBCS — SIGNIFICANT CHANGE UP (ref 0–0)
NRBC # BLD: 0 /100 WBCS — SIGNIFICANT CHANGE UP (ref 0–0)
NRBC # FLD: 0 K/UL — SIGNIFICANT CHANGE UP (ref 0–0)
NRBC # FLD: 0 K/UL — SIGNIFICANT CHANGE UP (ref 0–0)
PLATELET # BLD AUTO: 387 K/UL — SIGNIFICANT CHANGE UP (ref 150–400)
PLATELET # BLD AUTO: 387 K/UL — SIGNIFICANT CHANGE UP (ref 150–400)
POTASSIUM SERPL-MCNC: 3.8 MMOL/L — SIGNIFICANT CHANGE UP (ref 3.5–5.3)
POTASSIUM SERPL-MCNC: 3.8 MMOL/L — SIGNIFICANT CHANGE UP (ref 3.5–5.3)
POTASSIUM SERPL-SCNC: 3.8 MMOL/L — SIGNIFICANT CHANGE UP (ref 3.5–5.3)
POTASSIUM SERPL-SCNC: 3.8 MMOL/L — SIGNIFICANT CHANGE UP (ref 3.5–5.3)
PROT SERPL-MCNC: 7.3 G/DL — SIGNIFICANT CHANGE UP (ref 6–8.3)
PROT SERPL-MCNC: 7.3 G/DL — SIGNIFICANT CHANGE UP (ref 6–8.3)
RBC # BLD: 4.08 M/UL — SIGNIFICANT CHANGE UP (ref 3.8–5.2)
RBC # BLD: 4.08 M/UL — SIGNIFICANT CHANGE UP (ref 3.8–5.2)
RBC # FLD: 11.5 % — SIGNIFICANT CHANGE UP (ref 10.3–14.5)
RBC # FLD: 11.5 % — SIGNIFICANT CHANGE UP (ref 10.3–14.5)
SODIUM SERPL-SCNC: 139 MMOL/L — SIGNIFICANT CHANGE UP (ref 135–145)
SODIUM SERPL-SCNC: 139 MMOL/L — SIGNIFICANT CHANGE UP (ref 135–145)
WBC # BLD: 7.79 K/UL — SIGNIFICANT CHANGE UP (ref 3.8–10.5)
WBC # BLD: 7.79 K/UL — SIGNIFICANT CHANGE UP (ref 3.8–10.5)
WBC # FLD AUTO: 7.79 K/UL — SIGNIFICANT CHANGE UP (ref 3.8–10.5)
WBC # FLD AUTO: 7.79 K/UL — SIGNIFICANT CHANGE UP (ref 3.8–10.5)

## 2023-11-07 RX ORDER — SODIUM CHLORIDE 9 MG/ML
1000 INJECTION, SOLUTION INTRAVENOUS
Refills: 0 | Status: DISCONTINUED | OUTPATIENT
Start: 2023-11-20 | End: 2023-11-21

## 2023-11-07 NOTE — H&P PST ADULT - HISTORY OF PRESENT ILLNESS
38 y/o female PMH chronic hep B (patient reports low viral count, does not require treatment) presents to presurgical testing with diagnosis of nontoxic multinodular goiter. Pt is s/p vaginal delivery in 5/2023. Pt with bilateral thyroid nodules. Pt is scheduled for a right thyroid lobectomy possible total thyroidectomy possible paratracheal node dissection  36 y/o female PMH chronic hep B (patient reports low viral load, does not require treatment) presents to presurgical testing with diagnosis of nontoxic multinodular goiter. Pt is s/p vaginal delivery in 5/2023. Pt with bilateral thyroid nodules. Pt is scheduled for a right thyroid lobectomy possible total thyroidectomy possible paratracheal node dissection

## 2023-11-07 NOTE — H&P PST ADULT - NSANTHOSAYNRD_GEN_A_CORE
No. GET screening performed.  STOP BANG Legend: 0-2 = LOW Risk; 3-4 = INTERMEDIATE Risk; 5-8 = HIGH Risk

## 2023-11-08 LAB
HCG SERPL-ACNC: <1 MIU/ML — SIGNIFICANT CHANGE UP
HCG SERPL-ACNC: <1 MIU/ML — SIGNIFICANT CHANGE UP

## 2023-11-19 ENCOUNTER — TRANSCRIPTION ENCOUNTER (OUTPATIENT)
Age: 38
End: 2023-11-19

## 2023-11-20 ENCOUNTER — TRANSCRIPTION ENCOUNTER (OUTPATIENT)
Age: 38
End: 2023-11-20

## 2023-11-20 ENCOUNTER — APPOINTMENT (OUTPATIENT)
Dept: SURGERY | Facility: HOSPITAL | Age: 38
End: 2023-11-20
Payer: MEDICAID

## 2023-11-20 ENCOUNTER — OUTPATIENT (OUTPATIENT)
Dept: OUTPATIENT SERVICES | Facility: HOSPITAL | Age: 38
LOS: 1 days | Discharge: HOME | End: 2023-11-20
Payer: MEDICAID

## 2023-11-20 ENCOUNTER — RESULT REVIEW (OUTPATIENT)
Age: 38
End: 2023-11-20

## 2023-11-20 VITALS
OXYGEN SATURATION: 100 % | TEMPERATURE: 98 F | HEART RATE: 81 BPM | WEIGHT: 141.98 LBS | HEIGHT: 60 IN | SYSTOLIC BLOOD PRESSURE: 113 MMHG | DIASTOLIC BLOOD PRESSURE: 75 MMHG | RESPIRATION RATE: 15 BRPM

## 2023-11-20 VITALS
HEART RATE: 99 BPM | SYSTOLIC BLOOD PRESSURE: 125 MMHG | DIASTOLIC BLOOD PRESSURE: 80 MMHG | OXYGEN SATURATION: 99 % | RESPIRATION RATE: 18 BRPM

## 2023-11-20 DIAGNOSIS — Z98.890 OTHER SPECIFIED POSTPROCEDURAL STATES: Chronic | ICD-10-CM

## 2023-11-20 DIAGNOSIS — Z90.79 ACQUIRED ABSENCE OF OTHER GENITAL ORGAN(S): Chronic | ICD-10-CM

## 2023-11-20 DIAGNOSIS — E04.2 NONTOXIC MULTINODULAR GOITER: ICD-10-CM

## 2023-11-20 DIAGNOSIS — N97.9 FEMALE INFERTILITY, UNSPECIFIED: Chronic | ICD-10-CM

## 2023-11-20 LAB
CALCIUM SERPL-MCNC: 8.9 MG/DL — SIGNIFICANT CHANGE UP (ref 8.4–10.5)
HCG UR QL: NEGATIVE — SIGNIFICANT CHANGE UP
HCG UR QL: NEGATIVE — SIGNIFICANT CHANGE UP

## 2023-11-20 PROCEDURE — 60240 REMOVAL OF THYROID: CPT

## 2023-11-20 PROCEDURE — 88307 TISSUE EXAM BY PATHOLOGIST: CPT | Mod: 26

## 2023-11-20 PROCEDURE — 13132 CMPLX RPR F/C/C/M/N/AX/G/H/F: CPT | Mod: 59

## 2023-11-20 DEVICE — LIGATING CLIPS WECK HORIZON SMALL-WIDE (RED) 24: Type: IMPLANTABLE DEVICE | Status: FUNCTIONAL

## 2023-11-20 DEVICE — LIGATING CLIPS WECK HORIZON MEDIUM (BLUE) 24: Type: IMPLANTABLE DEVICE | Status: FUNCTIONAL

## 2023-11-20 RX ORDER — CALCIUM GLUCONATE 100 MG/ML
1 VIAL (ML) INTRAVENOUS ONCE
Refills: 0 | Status: COMPLETED | OUTPATIENT
Start: 2023-11-20 | End: 2023-11-20

## 2023-11-20 RX ORDER — METOCLOPRAMIDE HCL 10 MG
10 TABLET ORAL ONCE
Refills: 0 | Status: COMPLETED | OUTPATIENT
Start: 2023-11-20 | End: 2023-11-20

## 2023-11-20 RX ORDER — ONDANSETRON 8 MG/1
4 TABLET, FILM COATED ORAL ONCE
Refills: 0 | Status: COMPLETED | OUTPATIENT
Start: 2023-11-20 | End: 2023-11-20

## 2023-11-20 RX ORDER — BENZOCAINE AND MENTHOL 5; 1 G/100ML; G/100ML
1 LIQUID ORAL
Refills: 0 | Status: DISCONTINUED | OUTPATIENT
Start: 2023-11-20 | End: 2023-11-21

## 2023-11-20 RX ORDER — ACETAMINOPHEN 500 MG
2 TABLET ORAL
Qty: 0 | Refills: 0 | DISCHARGE
Start: 2023-11-20

## 2023-11-20 RX ORDER — HYDROMORPHONE HYDROCHLORIDE 2 MG/ML
0.5 INJECTION INTRAMUSCULAR; INTRAVENOUS; SUBCUTANEOUS
Refills: 0 | Status: DISCONTINUED | OUTPATIENT
Start: 2023-11-20 | End: 2023-11-21

## 2023-11-20 RX ORDER — CALCIUM CARBONATE 500(1250)
1 TABLET ORAL
Qty: 0 | Refills: 0 | DISCHARGE
Start: 2023-11-20

## 2023-11-20 RX ORDER — CALCIUM CARBONATE 500(1250)
1 TABLET ORAL EVERY 6 HOURS
Refills: 0 | Status: DISCONTINUED | OUTPATIENT
Start: 2023-11-20 | End: 2023-11-21

## 2023-11-20 RX ORDER — ACETAMINOPHEN 500 MG
1000 TABLET ORAL EVERY 6 HOURS
Refills: 0 | Status: DISCONTINUED | OUTPATIENT
Start: 2023-11-20 | End: 2023-11-21

## 2023-11-20 RX ORDER — OXYCODONE HYDROCHLORIDE 5 MG/1
5 TABLET ORAL ONCE
Refills: 0 | Status: DISCONTINUED | OUTPATIENT
Start: 2023-11-20 | End: 2023-11-21

## 2023-11-20 RX ADMIN — HYDROMORPHONE HYDROCHLORIDE 0.5 MILLIGRAM(S): 2 INJECTION INTRAMUSCULAR; INTRAVENOUS; SUBCUTANEOUS at 10:15

## 2023-11-20 RX ADMIN — Medication 1 TABLET(S): at 12:28

## 2023-11-20 RX ADMIN — Medication 10 MILLIGRAM(S): at 15:41

## 2023-11-20 RX ADMIN — Medication 100 GRAM(S): at 09:43

## 2023-11-20 RX ADMIN — HYDROMORPHONE HYDROCHLORIDE 0.5 MILLIGRAM(S): 2 INJECTION INTRAMUSCULAR; INTRAVENOUS; SUBCUTANEOUS at 11:34

## 2023-11-20 RX ADMIN — ONDANSETRON 4 MILLIGRAM(S): 8 TABLET, FILM COATED ORAL at 14:18

## 2023-11-20 NOTE — BRIEF OPERATIVE NOTE - OPERATION/FINDINGS
S/p total thyroidectomy, evidence of bilateral multinodular goiter. Hemostasis achieved with small clips and electrocautery

## 2023-11-20 NOTE — ASU DISCHARGE PLAN (ADULT/PEDIATRIC) - CARE PROVIDER_API CALL
Tomas Lopez  Surgery  [address] [place] [address] [address]  [place] [address]  Phone: [phone]  Fax: [phone]  Follow Up Time:

## 2023-11-20 NOTE — ASU DISCHARGE PLAN (ADULT/PEDIATRIC) - MEDICATION INSTRUCTIONS
You have received Tyelneol/Acetaminophen during your hospital stay. The max amount of Tylenol daily is 4000MG. Please keep that in mind if you are taking other prescription or over the Counter pain medications or cold Medicine. The next time that you can take tylenol is at 2p.m. You can then start taking it every 6 hours as  needed for pain.

## 2023-11-20 NOTE — ASU DISCHARGE PLAN (ADULT/PEDIATRIC) - NS MD DC FALL RISK RISK
For information on Fall & Injury Prevention, visit: [email or web address] OR  [email or web address]-injury OR  https://www.cdc.gov/steadi/patient.html

## 2023-11-21 ENCOUNTER — APPOINTMENT (OUTPATIENT)
Dept: SURGERY | Facility: CLINIC | Age: 38
End: 2023-11-21
Payer: MEDICAID

## 2023-11-21 PROCEDURE — 99024 POSTOP FOLLOW-UP VISIT: CPT

## 2023-11-21 RX ORDER — LEVOTHYROXINE SODIUM 0.1 MG/1
100 TABLET ORAL DAILY
Qty: 90 | Refills: 0 | Status: ACTIVE | COMMUNITY
Start: 2023-11-21 | End: 1900-01-01

## 2023-11-29 ENCOUNTER — NON-APPOINTMENT (OUTPATIENT)
Age: 38
End: 2023-11-29

## 2023-11-30 LAB
SURGICAL PATHOLOGY STUDY: SIGNIFICANT CHANGE UP
SURGICAL PATHOLOGY STUDY: SIGNIFICANT CHANGE UP

## 2023-12-18 ENCOUNTER — APPOINTMENT (OUTPATIENT)
Dept: OBGYN | Facility: CLINIC | Age: 38
End: 2023-12-18
Payer: MEDICAID

## 2023-12-18 ENCOUNTER — ASOB RESULT (OUTPATIENT)
Age: 38
End: 2023-12-18

## 2023-12-18 VITALS
HEIGHT: 60 IN | SYSTOLIC BLOOD PRESSURE: 116 MMHG | WEIGHT: 142 LBS | BODY MASS INDEX: 27.88 KG/M2 | DIASTOLIC BLOOD PRESSURE: 68 MMHG

## 2023-12-18 DIAGNOSIS — N93.9 ABNORMAL UTERINE AND VAGINAL BLEEDING, UNSPECIFIED: ICD-10-CM

## 2023-12-18 PROCEDURE — 99214 OFFICE O/P EST MOD 30 MIN: CPT

## 2023-12-18 RX ORDER — NORETHINDRONE ACETATE AND ETHINYL ESTRADIOL AND FERROUS FUMARATE 1MG-20(21)
1-20 KIT ORAL
Qty: 3 | Refills: 2 | Status: ACTIVE | COMMUNITY
Start: 2023-12-18 | End: 1900-01-01

## 2023-12-18 NOTE — PROCEDURE
[Abnormal Uterine Bleeding] : abnormal uterine bleeding [FreeTextEntry4] : left ovarian 4.8cm simple cyst EM lining WNL

## 2023-12-18 NOTE — HISTORY OF PRESENT ILLNESS
[FreeTextEntry1] : Patient is a 37 y/o P4 LMP 11/22/2023 presenting with abnormal uterine bleeding. She has been experiencing AUB since  11/22/2023. Patient reports that she recently had thyroidectomy on 11/20/2023, two days later she started bleeding and bleeding stopped 2 days ago. Patient reports that prior to this surgery she would bleed for a week and then would stop and then restart.

## 2023-12-18 NOTE — PLAN
[FreeTextEntry1] : 38 yr old with c/o of AUB since her thyroidectomy 11/20/2023 - TVUS done today, EM lining WNL, left ovarian simple cyst 4.8cm noted, torsion precautions given  - COCP given, discussed that thyroid can affect vaginal bleeding recommended COCP until thyroid function is  under control - AUB panel sent  - f/u in 2-3 months for f/u GYN sonogram due to ovarian cyst

## 2023-12-19 LAB
ALBUMIN SERPL ELPH-MCNC: 4.6 G/DL
ALP BLD-CCNC: 58 U/L
ALT SERPL-CCNC: 23 U/L
ANION GAP SERPL CALC-SCNC: 10 MMOL/L
AST SERPL-CCNC: 23 U/L
BASOPHILS # BLD AUTO: 0.03 K/UL
BASOPHILS NFR BLD AUTO: 0.5 %
BILIRUB SERPL-MCNC: 0.2 MG/DL
BUN SERPL-MCNC: 11 MG/DL
CALCIUM SERPL-MCNC: 10.4 MG/DL
CHLORIDE SERPL-SCNC: 106 MMOL/L
CO2 SERPL-SCNC: 22 MMOL/L
CREAT SERPL-MCNC: 0.47 MG/DL
DHEA-S SERPL-MCNC: 131 UG/DL
EGFR: 126 ML/MIN/1.73M2
EOSINOPHIL # BLD AUTO: 0.15 K/UL
EOSINOPHIL NFR BLD AUTO: 2.4 %
ESTIMATED AVERAGE GLUCOSE: 100 MG/DL
FSH SERPL-MCNC: 6.2 IU/L
GLUCOSE SERPL-MCNC: 98 MG/DL
HBA1C MFR BLD HPLC: 5.1 %
HCT VFR BLD CALC: 36.3 %
HGB BLD-MCNC: 11.5 G/DL
IMM GRANULOCYTES NFR BLD AUTO: 0.3 %
LH SERPL-ACNC: 6.4 IU/L
LYMPHOCYTES # BLD AUTO: 2.81 K/UL
LYMPHOCYTES NFR BLD AUTO: 44.2 %
MAN DIFF?: NORMAL
MCHC RBC-ENTMCNC: 30.1 PG
MCHC RBC-ENTMCNC: 31.7 GM/DL
MCV RBC AUTO: 95 FL
MONOCYTES # BLD AUTO: 0.58 K/UL
MONOCYTES NFR BLD AUTO: 9.1 %
NEUTROPHILS # BLD AUTO: 2.77 K/UL
NEUTROPHILS NFR BLD AUTO: 43.5 %
PLATELET # BLD AUTO: 384 K/UL
POTASSIUM SERPL-SCNC: 4.6 MMOL/L
PROLACTIN SERPL-MCNC: 14.7 NG/ML
PROT SERPL-MCNC: 7.2 G/DL
RBC # BLD: 3.82 M/UL
RBC # FLD: 13.2 %
SODIUM SERPL-SCNC: 138 MMOL/L
T4 FREE SERPL-MCNC: 1.3 NG/DL
T4 SERPL-MCNC: 7.5 UG/DL
TESTOST FREE SERPL-MCNC: 0.4 PG/ML
TESTOST SERPL-MCNC: 2.7 NG/DL
TSH SERPL-ACNC: 2.48 UIU/ML
WBC # FLD AUTO: 6.36 K/UL

## 2024-01-04 ENCOUNTER — APPOINTMENT (OUTPATIENT)
Dept: SURGERY | Facility: CLINIC | Age: 39
End: 2024-01-04
Payer: MEDICAID

## 2024-01-04 PROCEDURE — 99024 POSTOP FOLLOW-UP VISIT: CPT

## 2024-01-04 NOTE — PHYSICAL EXAM
[de-identified] : well healed scar [Midline] : located in midline position [Normal] : orientation to person, place, and time: normal

## 2024-01-25 NOTE — OB PROVIDER H&P - PRO HBSAG INFANT
Writer coordinated meeting so that patient could meet with ICM (Wanda) and his rep payee (sister Ginger). Writer coordinated meeting so that ICM would be able to complete MA application with patient. Writer then follow up with patient and sister surrounding meeting. Writer awaiting response from ICM to coordinate pending and necessary information.   positive

## 2024-04-16 ENCOUNTER — APPOINTMENT (OUTPATIENT)
Dept: ANTEPARTUM | Facility: CLINIC | Age: 39
End: 2024-04-16

## 2024-04-16 ENCOUNTER — APPOINTMENT (OUTPATIENT)
Dept: OBGYN | Facility: CLINIC | Age: 39
End: 2024-04-16

## 2024-04-23 NOTE — OB RN DELIVERY SUMMARY - NS_CORDBLDTYPEA_OBGYN_ALL_OB
Assumed care of the patient. Report received from ADRIEL Orozco. Pt on continuous cardiac monitoring, continuous pulse oximetry, and automatic BP cuff cycling Q30 min. Pt in hospital gown, side rails up X2, bed low and locked, and call light is placed within reach. Family/visitors at bedside at this time. Pt denies any complaints or needs. Whiteboard updated with patient's plan of care.    Yes

## 2024-06-25 ENCOUNTER — APPOINTMENT (OUTPATIENT)
Dept: SURGERY | Facility: CLINIC | Age: 39
End: 2024-06-25

## 2024-06-25 DIAGNOSIS — E04.2 NONTOXIC MULTINODULAR GOITER: ICD-10-CM

## 2024-06-25 PROCEDURE — 99212 OFFICE O/P EST SF 10 MIN: CPT

## 2024-08-23 ENCOUNTER — RX RENEWAL (OUTPATIENT)
Age: 39
End: 2024-08-23

## 2024-08-23 RX ORDER — NORETHINDRONE ACETATE AND ETHINYL ESTRADIOL AND FERROUS FUMARATE 1MG-20(21)
1-20 KIT ORAL
Qty: 1 | Refills: 3 | Status: ACTIVE | COMMUNITY
Start: 2024-08-23 | End: 1900-01-01

## 2024-12-12 ENCOUNTER — APPOINTMENT (OUTPATIENT)
Dept: SURGERY | Facility: CLINIC | Age: 39
End: 2024-12-12

## 2025-01-07 NOTE — OB PROVIDER H&P - NSWIC_OBGYN_ALL_OB
Saturation: Site=PA (Pulmonary Artery) , O2=60.8 %, Hgb=11.7 gm/dl, Condition=Condition 1. Used in calculation. No

## 2025-01-24 NOTE — OB RN PATIENT PROFILE - NS PRO DEPRESSION SCREENING Y/N6
Received request via: Patient    Was the patient seen in the last year in this department? Yes    Does the patient have an active prescription (recently filled or refills available) for medication(s) requested? No    Pharmacy Name: [facility]    Does the patient have skilled nursing Plus and need 100-day supply? (This applies to ALL medications) Patient does not have SCP   yes

## 2025-06-20 NOTE — DISCHARGE NOTE ANTEPARTUM - DISCHARGE DATE
Group Topic: BH Physical Activity    Date: 6/20/2025  Start Time: 0925  End Time: 0945  Facilitators: Mariana Johns CTRS    Focus: Fitness  Number in attendance: 7  To provide a fitness group to encourage stretching and exercises to assist with the patients physical well-being as facilitated by the therapist.    Method: Group  Attendance: Present  Participation: Moderate  Patient Response: Able to return demonstration, Appropriate feedback, Attentive, Good eye contact, Interested in topic, and Passive  Mood: Anxious and Depressed  Affect: Type: Anxious and Depressed   Range: Blunted/flat   Congruency: Congruent   Stability: Stable  Behavior/Socialization: Appropriate to group, Cooperative, and Engaged  Thought Process: Goal-directed  Task Performance: Follows directions  Patient Evaluation: Encouragement - needs prompts     07-Jan-2021

## (undated) DEVICE — SOL IRR POUR H2O 1500ML

## (undated) DEVICE — SAFETY PIN

## (undated) DEVICE — DRAPE 3/4 SHEET 52X76"

## (undated) DEVICE — SUT SILK 2-0 18" FS

## (undated) DEVICE — PREP BETADINE KIT

## (undated) DEVICE — LAP PAD W RING 18 X 18"

## (undated) DEVICE — DRAPE LAPAROTOMY TRANSVERSE

## (undated) DEVICE — VENODYNE/SCD SLEEVE CALF MEDIUM

## (undated) DEVICE — DRAIN JACKSON PRATT 7MM FLAT FULL NO TROCAR

## (undated) DEVICE — DRSG BENZOIN 0.6CC

## (undated) DEVICE — APPLICATOR Q TIP 6" WOOD STEM

## (undated) DEVICE — GLV 7 PROTEXIS (WHITE)

## (undated) DEVICE — SUT VICRYL 3-0 18" SH (POP-OFF)

## (undated) DEVICE — SUT VICRYL 2-0 18" TIES UNDYED

## (undated) DEVICE — SUT MONOCRYL 4-0 27" PS-2 UNDYED

## (undated) DEVICE — PACK HEAD & NECK

## (undated) DEVICE — DRAIN RESERVOIR FOR JACKSON PRATT 100CC CARDINAL

## (undated) DEVICE — LABELS BLANK W PEN

## (undated) DEVICE — BIPOLAR FORCEP KIRWAN CUSHING 6" X 1MM W 12FT CORD (GREEN)

## (undated) DEVICE — CANISTER DISPOSABLE THIN WALL 3000CC

## (undated) DEVICE — SOL IRR POUR H2O 500ML

## (undated) DEVICE — SUT VICRYL 2-0 27" SH UNDYED

## (undated) DEVICE — SUT CHROMIC 3-0 27" SH

## (undated) DEVICE — SUT PROLENE 0 30" CT-1

## (undated) DEVICE — ELCTR GROUNDING PAD ADULT COVIDIEN

## (undated) DEVICE — ELCTR BOVIE PENCIL SMOKE EVACUATION

## (undated) DEVICE — SUT VICRYL 3-0 18" TIES UNDYED

## (undated) DEVICE — PROTECTOR HEEL / ELBOW FLUFFY